# Patient Record
Sex: MALE | Race: WHITE | NOT HISPANIC OR LATINO | Employment: PART TIME | URBAN - METROPOLITAN AREA
[De-identification: names, ages, dates, MRNs, and addresses within clinical notes are randomized per-mention and may not be internally consistent; named-entity substitution may affect disease eponyms.]

---

## 2018-04-17 ENCOUNTER — OFFICE VISIT CONVERTED (OUTPATIENT)
Dept: FAMILY MEDICINE CLINIC | Facility: CLINIC | Age: 53
End: 2018-04-17
Attending: FAMILY MEDICINE

## 2018-08-31 ENCOUNTER — CONVERSION ENCOUNTER (OUTPATIENT)
Dept: FAMILY MEDICINE CLINIC | Facility: CLINIC | Age: 53
End: 2018-08-31

## 2018-08-31 ENCOUNTER — OFFICE VISIT CONVERTED (OUTPATIENT)
Dept: FAMILY MEDICINE CLINIC | Facility: CLINIC | Age: 53
End: 2018-08-31
Attending: FAMILY MEDICINE

## 2018-11-09 ENCOUNTER — OFFICE VISIT CONVERTED (OUTPATIENT)
Dept: FAMILY MEDICINE CLINIC | Facility: CLINIC | Age: 53
End: 2018-11-09
Attending: FAMILY MEDICINE

## 2018-12-03 ENCOUNTER — OFFICE VISIT CONVERTED (OUTPATIENT)
Dept: FAMILY MEDICINE CLINIC | Facility: CLINIC | Age: 53
End: 2018-12-03
Attending: FAMILY MEDICINE

## 2019-01-29 ENCOUNTER — OFFICE VISIT CONVERTED (OUTPATIENT)
Dept: CARDIOLOGY | Facility: CLINIC | Age: 54
End: 2019-01-29
Attending: SPECIALIST

## 2019-09-09 ENCOUNTER — OFFICE VISIT CONVERTED (OUTPATIENT)
Dept: FAMILY MEDICINE CLINIC | Facility: CLINIC | Age: 54
End: 2019-09-09
Attending: FAMILY MEDICINE

## 2019-09-10 ENCOUNTER — HOSPITAL ENCOUNTER (OUTPATIENT)
Dept: FAMILY MEDICINE CLINIC | Facility: CLINIC | Age: 54
Discharge: HOME | End: 2019-09-10
Attending: FAMILY MEDICINE

## 2019-09-10 LAB
CHOLEST SERPL-MCNC: 135 MG/DL (ref 107–200)
CHOLEST/HDLC SERPL: 2.9 {RATIO} (ref 3–6)
GLUCOSE SERPL-MCNC: 94 MG/DL (ref 70–99)
HDLC SERPL-MCNC: 47 MG/DL (ref 40–60)
LDLC SERPL CALC-MCNC: 73 MG/DL (ref 70–100)
TRIGL SERPL-MCNC: 73 MG/DL (ref 40–150)
VLDLC SERPL-MCNC: 15 MG/DL (ref 5–37)

## 2019-09-27 ENCOUNTER — OFFICE VISIT CONVERTED (OUTPATIENT)
Dept: FAMILY MEDICINE CLINIC | Facility: CLINIC | Age: 54
End: 2019-09-27
Attending: FAMILY MEDICINE

## 2019-12-06 ENCOUNTER — OFFICE VISIT CONVERTED (OUTPATIENT)
Dept: ORTHOPEDIC SURGERY | Facility: CLINIC | Age: 54
End: 2019-12-06
Attending: ORTHOPAEDIC SURGERY

## 2019-12-26 ENCOUNTER — HOSPITAL ENCOUNTER (OUTPATIENT)
Dept: URGENT CARE | Facility: CLINIC | Age: 54
Discharge: HOME | End: 2019-12-26
Attending: FAMILY MEDICINE

## 2020-01-15 ENCOUNTER — OFFICE VISIT CONVERTED (OUTPATIENT)
Dept: ORTHOPEDIC SURGERY | Facility: CLINIC | Age: 55
End: 2020-01-15
Attending: ORTHOPAEDIC SURGERY

## 2020-01-31 ENCOUNTER — OFFICE VISIT CONVERTED (OUTPATIENT)
Dept: CARDIOLOGY | Facility: CLINIC | Age: 55
End: 2020-01-31
Attending: SPECIALIST

## 2020-02-05 ENCOUNTER — OFFICE VISIT CONVERTED (OUTPATIENT)
Dept: ORTHOPEDIC SURGERY | Facility: CLINIC | Age: 55
End: 2020-02-05
Attending: ORTHOPAEDIC SURGERY

## 2020-06-09 ENCOUNTER — OFFICE VISIT CONVERTED (OUTPATIENT)
Dept: FAMILY MEDICINE CLINIC | Facility: CLINIC | Age: 55
End: 2020-06-09
Attending: FAMILY MEDICINE

## 2020-08-17 ENCOUNTER — OFFICE VISIT CONVERTED (OUTPATIENT)
Dept: FAMILY MEDICINE CLINIC | Facility: CLINIC | Age: 55
End: 2020-08-17
Attending: FAMILY MEDICINE

## 2020-09-10 ENCOUNTER — OFFICE VISIT CONVERTED (OUTPATIENT)
Dept: FAMILY MEDICINE CLINIC | Facility: CLINIC | Age: 55
End: 2020-09-10
Attending: FAMILY MEDICINE

## 2020-09-10 ENCOUNTER — HOSPITAL ENCOUNTER (OUTPATIENT)
Dept: LAB | Facility: HOSPITAL | Age: 55
Discharge: HOME | End: 2020-09-10
Attending: FAMILY MEDICINE

## 2020-09-10 LAB
CHOLEST SERPL-MCNC: 148 MG/DL (ref 107–200)
CHOLEST/HDLC SERPL: 2.9 {RATIO} (ref 3–6)
GLUCOSE SERPL-MCNC: 92 MG/DL (ref 70–99)
HDLC SERPL-MCNC: 51 MG/DL (ref 40–60)
LDLC SERPL CALC-MCNC: 89 MG/DL (ref 70–100)
PSA SERPL-MCNC: 1.03 NG/ML (ref 0–4)
TRIGL SERPL-MCNC: 40 MG/DL (ref 40–150)
VLDLC SERPL-MCNC: 8 MG/DL (ref 5–37)

## 2021-03-29 ENCOUNTER — OFFICE VISIT CONVERTED (OUTPATIENT)
Dept: ORTHOPEDIC SURGERY | Facility: CLINIC | Age: 56
End: 2021-03-29
Attending: ORTHOPAEDIC SURGERY

## 2021-05-13 NOTE — PROGRESS NOTES
Progress Note      Patient Name: Jose Luis Casper   Patient ID: [id number]   Sex: Male   YOB: 1965    Primary Care Provider: Steve Frederick DO   Referring Provider: Steve Frederick DO    Visit Date: August 17, 2020    Provider: Steve Frederick DO   Location: [facility]   Location Address: [address] [place] [address]  [id number]   Location Phone: [phone]          Chief Complaint  · pt c/o hands cracking open and bleeding      History Of Present Illness  Jose Luis Casper is a 55 year old /White male who presents for evaluation and treatment of: cracking hands. HIs hands have gotten worse again. He has changed soaps and applies lotion several times daily.       Past Medical History  Disease Name Date Onset Notes   Arthritis --  --    Chest pain --  --    Colon cancer screening 09/14/2016 --    Left Elbow Olecranon Bursectomy - Aftercare following surgery 08/27/2015 --    Left Elbow Olecranon Bursitis 08/03/2015 --    Post Musc/Ske Surgery-Left elbow olecranon bursectomy 09/09/2015 --    Right ankle lateral process talus fracture 11/23/2017 --    Seasonal allergies --  --    Thyroid disorder --  --          Past Surgical History  Procedure Name Date Notes   ACL repair --  --    Colonoscopy --  --    EGD 2000 --    Joint Surgery --  --    Lung Biopsy(s) --  --    Thyroidectomy-partial --  --    Toe Surgery --  --          Medication List  Name Date Started Instructions   fluticasone propionate 50 mcg/actuation nasal spray,suspension 06/09/2020 spray 1 spray (50 mcg) in each nostril by intranasal route once daily for 30 days   triamcinolone acetonide 0.1 % topical cream 08/17/2020 apply a thin layer to the affected area(s) by topical route 2 times per day for 1 day         Allergy List  Allergen Name Date Reaction Notes   Acetaminophen adverse reaction --  --  --    bacitracin --  --  --    erythromycin --  --  --    Neosporin --  --  --    NSAIDS --  --  --           Family Medical History  Disease Name Relative/Age Notes   Heart Disease Father/   Father  Mother   Cancer, Unspecified Father/  Mother/   Mother; Father   Diabetes, unspecified type Father/   Father   Family history of certain chronic disabling diseases; arthritis Sister/   Sister   Family history of Arthritis Sister/   Sister         Social History  Finding Status Start/Stop Quantity Notes   Alcohol Current some day --/-- 2 dpw 12/03/2018 -    Alcohol Use Current some day --/-- --  occasionally drinks, less than 1 drink per day, has been drinking for 31 or more years   Claustophobic Unknown --/-- --  yes   Exercises regularly Current some day --/-- --  bicycle, walking, kayaking   lives alone --  --/-- --  --    Recreational Drug Use Never --/-- --  no   Single. --  --/-- --  --    Tobacco Never --/-- --  never smoker   Working --  --/-- --  --          Immunizations  NameDate Admin Mfg Trade Name Lot Number Route Inj VIS Given VIS Publication   Aiccmuhlx52/03/2018 [facility] Fluzone Quadrivalent [id number] IM LD 12/03/2018 08/07/2015   Comments: [id number]         Review of Systems  · Constitutional  o Denies  o : fatigue  · Integument  o Admits  o : skin dryness      Vitals  Date Time BP Position Site L\R Cuff Size HR RR TEMP (F) WT  HT  BMI kg/m2 BSA m2 O2 Sat HC       01/31/2020 09:19 /76 Sitting    68 - R   203lbs 0oz 6'   27.53 2.16     06/09/2020 03:23 /75 Sitting    66 - R  97.8 200lbs 8oz 6'   27.19 2.15 98 %    08/17/2020 12:05 /76 Sitting    70 - R  98.6 202lbs 2oz 6'   27.41 2.16 98 %          Physical Examination  · Constitutional  o Appearance  o : well-nourished, well developed, no obvious deformities present  · Skin and Subcutaneous Tissue  o Digits and Nails  o : cracking and fissuring 3rd digits bilaterally.          Assessment  · Atopic dermatitis, unspecified type     691.8/L20.9  will add Kenalog injection plus add topical. He needs good emmloient  daily      Plan  · Orders  o ACO-39: Current medications updated and reviewed () - - 08/17/2020  o 4.00 - Kenalog Injection 40mg (-5) - 691.8/L20.9 - 08/17/2020   Injection - Kenalog 40 mg; Dose: 40 mg; Site: Right Gluteus; Route: intramuscular; Date: 08/17/2020 13:30:05; Exp: 04/01/2021; Lot: [id number]; Mfg: [employer] TradeName: triamcinolone; Location: [facility]; Administered By: Alexia Benavides MA; Comment: NDC- 8369-1374-94 pt tolerated well with no complaints  · Medications  o triamcinolone acetonide 0.1 % topical cream   SIG: apply a thin layer to the affected area(s) by topical route 2 times per day for 1 day   DISP: (1) 30 gm tube with 1 refills  Adjusted on 08/17/2020     o Medications have been Reconciled  o Transition of Care or Provider Policy  · Instructions  o Patient was educated/instructed on their diagnosis, treatment and medications prior to discharge from the clinic today.            Electronically Signed by: Alexia Benavides MA -Author on August 17, 2020 01:33:43 PM  Electronically Co-signed by: Steve Frederick DO -Reviewer on August 17, 2020 02:12:53 PM

## 2021-05-13 NOTE — PROGRESS NOTES
Progress Note      Patient Name: Jose Luis Casper   Patient ID: [id number]   Sex: Male   YOB: 1965    Primary Care Provider: Steve Frederick DO   Referring Provider: Steve Frederick DO    Visit Date: June 9, 2020    Provider: Steve Frederick DO   Location: [facility]   Location Address: [address] [place] [address]  [id number]   Location Phone: [phone]          Chief Complaint  · pt c/o left ear pain for 3 days      History Of Present Illness  Jose Luis Casper is a 55 year old /White male who presents for evaluation and treatment of: ear ache. He states that he has a left ear ache for the past 3 days. No ear drainage. No fever or chills. No head congestion.       Past Medical History  Disease Name Date Onset Notes   Arthritis --  --    Chest pain --  --    Colon cancer screening 09/14/2016 --    Left Elbow Olecranon Bursectomy - Aftercare following surgery 08/27/2015 --    Left Elbow Olecranon Bursitis 08/03/2015 --    Post Musc/Ske Surgery-Left elbow olecranon bursectomy 09/09/2015 --    Right ankle lateral process talus fracture 11/23/2017 --    Seasonal allergies --  --    Thyroid disorder --  --          Past Surgical History  Procedure Name Date Notes   ACL repair --  --    Colonoscopy --  --    EGD 2000 --    Joint Surgery --  --    Lung Biopsy(s) --  --    Thyroidectomy-partial --  --    Toe Surgery --  --          Allergy List  Allergen Name Date Reaction Notes   Acetaminophen adverse reaction --  --  --    bacitracin --  --  --    erythromycin --  --  --    Neosporin --  --  --    NSAIDS --  --  --          Family Medical History  Disease Name Relative/Age Notes   Heart Disease Father/   Father  Mother   Cancer, Unspecified Father/  Mother/   Mother; Father   Diabetes, unspecified type Father/   Father   Family history of certain chronic disabling diseases; arthritis Sister/   Sister   Family history of Arthritis Sister/   Sister         Social  History  Finding Status Start/Stop Quantity Notes   Alcohol Current some day --/-- 2 dpw 12/03/2018 -    Alcohol Use Current some day --/-- --  occasionally drinks, less than 1 drink per day, has been drinking for 31 or more years   Claustophobic Unknown --/-- --  yes   Exercises regularly Current some day --/-- --  bicycle, walking, kayaking   lives alone --  --/-- --  --    Recreational Drug Use Never --/-- --  no   Single. --  --/-- --  --    Tobacco Never --/-- --  never smoker   Working --  --/-- --  --          Immunizations  NameDate Admin Mfg Trade Name Lot Number Route Inj VIS Given VIS Publication   Neynwkvuq39/03/2018 [facility] Fluzone Quadrivalent [id number] IM LD 12/03/2018 08/07/2015   Comments: [id number]         Review of Systems  · Constitutional  o Denies  o : fatigue, fever, chills  · HENT  o Admits  o : ear pain  o Denies  o : nasal congestion, nasal discharge, postnasal drip, sore throat  · Respiratory  o Denies  o : shortness of breath, wheezing, cough, dyspnea on exertion      Vitals  Date Time BP Position Site L\R Cuff Size HR RR TEMP (F) WT  HT  BMI kg/m2 BSA m2 O2 Sat        09/27/2019 09:21 /71 Sitting    78 - R  97.5 201lbs 4oz 6'   27.29 2.15 98 %    01/31/2020 09:19 /76 Sitting    68 - R   203lbs 0oz 6'   27.53 2.16     06/09/2020 03:23 /75 Sitting    66 - R  97.8 200lbs 8oz 6'   27.19 2.15 98 %          Physical Examination  · Constitutional  o Appearance  o : well-nourished, well developed, alert, in no acute distress, well-tended appearance  · Head and Face  o Head  o :   § Inspection  § : atraumatic, normocephalic  o Face  o :   § Inspection  § : no facial lesions  o HEENT  o : Unremarkable  · Eyes  o Conjunctivae  o : conjunctivae normal  o Sclerae  o : sclerae white  o Pupils and Irises  o : pupils equal and round, pupils reactive to light bilaterally  o Eyelids/Ocular Adnexae  o : eyelid appearance normal  · Ears, Nose, Mouth and Throat  o Ears  o :   § External  Ears  § : appearance within normal limits, no lesions present  § Otoscopic Examination  § : left TM bulging and flat  o Nose  o :   § External Nose  § : appearance normal  o Oral Cavity  o :   § Oral Mucosa  § : oral mucosa normal  § Lips  § : lip appearance normal  § Teeth  § : normal dentition for age  § Gums  § : gums pink, non-swollen, no bleeding present  § Tongue  § : tongue appearance normal  § Palate  § : hard palate normal, soft palate appearance normal  o Throat  o :   § Oropharynx  § : no inflammation or lesions present, tonsils within normal limits  · Neck  o Inspection/Palpation  o : normal appearance, no masses or tenderness, trachea midline  o Thyroid  o : gland size normal, nontender, no nodules or masses present on palpation  · Respiratory  o Respiratory Effort  o : breathing unlabored  o Auscultation of Lungs  o : normal breath sounds  · Cardiovascular  o Heart  o :   § Auscultation of Heart  § : regular rate, normal rhythm, no murmurs present  · Lymphatic  o Neck  o : no lymphadenopathy           Assessment  · Screening for depression     V79.0/Z13.89  · ETD (Eustachian tube dysfunction), left     381.81/H69.82  will add steroid nasal spray      Plan  · Orders  o ACO-18: Negative screen for clinical depression using a standardized tool () - V79.0/Z13.89 - 06/09/2020  o ACO-39: Current medications updated and reviewed () - - 06/09/2020  · Medications  o fluticasone propionate 50 mcg/actuation nasal spray,suspension   SIG: spray 1 spray (50 mcg) in each nostril by intranasal route once daily for 30 days   DISP: (1) 9.9 ml aer w/adap with 1 refills  Prescribed on 06/09/2020     o Medications have been Reconciled  o Transition of Care or Provider Policy  · Instructions  o Depression Screen completed and scanned into the EMR under the designated folder within the patient's documents.  o Today's PHQ-9 result is 2  o Patient is taking medications as prescribed and doing well.   o Take all  medications as prescribed/directed.  o Patient instructed/educated on their diet and exercise program.  o Patient was educated/instructed on their diagnosis, treatment and medications prior to discharge from the clinic today.  o Patient instructed to seek medical attention urgently for new or worsening symptoms.  o Call the office with any concerns or questions.  o Bring all medicines with their bottles to each office visit.  · Disposition  o Call or Return if symptoms worsen or persist.            Electronically Signed by: Steve Frederick, DO -Author on June 9, 2020 03:55:01 PM

## 2021-05-13 NOTE — PROGRESS NOTES
Progress Note      Patient Name: Jose Luis Casper   Patient ID: [id number]   Sex: Male   YOB: 1965    Primary Care Provider: Steve Frederick DO   Referring Provider: Steve Frederick DO    Visit Date: September 10, 2020    Provider: Steve Frederick DO   Location: [facility]   Location Address: [address] [place] [address]  [id number]   Location Phone: [phone]          Chief Complaint  · Annual Physical       History Of Present Illness  Jose Luis Casper is a 55 year old /White male who presents for annual exam. He states that he is doing well. He has NO concerns today.       Past Medical History  Disease Name Date Onset Notes   Arthritis --  --    Chest pain --  --    Colon cancer screening 09/14/2016 --    Left Elbow Olecranon Bursectomy - Aftercare following surgery 08/27/2015 --    Left Elbow Olecranon Bursitis 08/03/2015 --    Post Musc/Ske Surgery-Left elbow olecranon bursectomy 09/09/2015 --    Right ankle lateral process talus fracture 11/23/2017 --    Seasonal allergies --  --    Thyroid disorder --  --          Past Surgical History  Procedure Name Date Notes   ACL repair --  --    Colonoscopy --  --    EGD 2000 --    Joint Surgery --  --    Lung Biopsy(s) --  --    Thyroidectomy-partial --  --    Toe Surgery --  --          Medication List  Name Date Started Instructions   triamcinolone acetonide 0.1 % topical cream 08/17/2020 apply a thin layer to the affected area(s) by topical route 2 times per day for 1 day         Allergy List  Allergen Name Date Reaction Notes   Acetaminophen adverse reaction --  --  --    bacitracin --  --  --    erythromycin --  --  --    Neosporin --  --  --    NSAIDS --  --  --          Family Medical History  Disease Name Relative/Age Notes   Heart Disease Father/   Father  Mother   Cancer, Unspecified Father/  Mother/   Mother; Father   Diabetes, unspecified type Father/   Father   Family history of  certain chronic disabling diseases; arthritis Sister/   Sister   Family history of Arthritis Sister/   Sister         Social History  Finding Status Start/Stop Quantity Notes   Alcohol Current some day --/-- 2 dpw 12/03/2018 -    Alcohol Use Current some day --/-- --  occasionally drinks, less than 1 drink per day, has been drinking for 31 or more years   Claustophobic Unknown --/-- --  yes   Exercises regularly Current some day --/-- --  bicycle, walking, kayaking   lives alone --  --/-- --  --    Recreational Drug Use Never --/-- --  no   Single. --  --/-- --  --    Tobacco Never --/-- --  never smoker   Working --  --/-- --  --          Immunizations  NameDate Admin Mfg Trade Name Lot Number Route Inj VIS Given VIS Publication   Qcaazvkfr73/03/2018 [facility] Fluzone Quadrivalent [id number] IM LD 12/03/2018 08/07/2015   Comments: [id number]         Review of Systems  · Constitutional  o Denies  o : fatigue  · Eyes  o Denies  o : changes in vision  · HENT  o Denies  o : headaches  · Cardiovascular  o Denies  o : chest pain, irregular heart beats, rapid heart rate  · Respiratory  o Denies  o : shortness of breath, wheezing, cough, dyspnea on exertion  · Gastrointestinal  o Denies  o : diarrhea, constipation  · Psychiatric  o Denies  o : anxiety, depression      Vitals  Date Time BP Position Site L\R Cuff Size HR RR TEMP (F) WT  HT  BMI kg/m2 BSA m2 O2 Sat        06/09/2020 03:23 /75 Sitting    66 - R  97.8 200lbs 8oz 6'   27.19 2.15 98 %    08/17/2020 12:05 /76 Sitting    70 - R  98.6 202lbs 2oz 6'   27.41 2.16 98 %    09/10/2020 08:29 /73 Sitting    62 - R  97.8 197lbs 8oz 6'   26.79 2.13 98 %          Physical Examination  · Constitutional  o Appearance  o : well-nourished, well developed, alert, in no acute distress, well-tended appearance  · Head and Face  o Head  o :   § Inspection  § : atraumatic, normocephalic  o Face  o :   § Inspection  § : no facial lesions  o HEENT  o :  Unremarkable  · Eyes  o Conjunctivae  o : conjunctivae normal  o Sclerae  o : sclerae white  o Pupils and Irises  o : pupils equal and round, pupils reactive to light bilaterally  o Eyelids/Ocular Adnexae  o : eyelid appearance normal  · Ears, Nose, Mouth and Throat  o Ears  o :   § External Ears  § : appearance within normal limits, no lesions present  § Otoscopic Examination  § : tympanic membrane appearance within normal limits bilaterally without perforations, mobility normal  o Nose  o :   § External Nose  § : appearance normal  o Oral Cavity  o :   § Oral Mucosa  § : oral mucosa normal  § Lips  § : lip appearance normal  § Teeth  § : normal dentition for age  § Gums  § : gums pink, non-swollen, no bleeding present  § Tongue  § : tongue appearance normal  § Palate  § : hard palate normal, soft palate appearance normal  o Throat  o :   § Oropharynx  § : no inflammation or lesions present, tonsils within normal limits  · Neck  o Inspection/Palpation  o : normal appearance, no masses or tenderness, trachea midline  o Thyroid  o : gland size normal, nontender, no nodules or masses present on palpation  · Respiratory  o Respiratory Effort  o : breathing unlabored  o Auscultation of Lungs  o : normal breath sounds  · Cardiovascular  o Heart  o :   § Auscultation of Heart  § : regular rate, normal rhythm, no murmurs present  o Peripheral Vascular System  o :   § Extremities  § : no edema  · Gastrointestinal  o Abdominal Examination  o : abdomen nontender to palpation, normal bowel sounds, tone normal without rigidity or guarding, no masses present  o Liver and spleen  o : no hepatomegaly present  · Lymphatic  o Neck  o : no lymphadenopathy           Assessment  · Screening for prostate cancer     V76.44/Z12.5  · Encounter for screening for cardiovascular disorders     V81.2/Z13.6  · Annual physical exam     V70.0/Z00.00      Plan  · Orders  o Lipid Panel [facility] (10545) - V70.0/Z00.00 - 09/10/2020  o ACO-39: Current  medications updated and reviewed () - - 09/10/2020  o PSA Ultrasensitive, ANNUAL SCREENING [facility] (17348, ) - V76.44/Z12.5 - 09/10/2020  o Glucose Fasting [facility] (55029) - V70.0/Z00.00 - 09/10/2020  · Medications  o Medications have been Reconciled  o Transition of Care or Provider Policy  · Instructions  o Reviewed health maintenance flowsheet and updated information. Orders were placed and/or patient's response was documented.  o Patient instructed/educated on their diet and exercise program.  o Patient was educated/instructed on their diagnosis, treatment and medications prior to discharge from the clinic today.  o Patient instructed to seek medical attention urgently for new or worsening symptoms.  o Call the office with any concerns or questions.  · Disposition  o Call or Return if symptoms worsen or persist.  o Return Visit Request in/on 1 year +/- 2 days ([id number]).            Electronically Signed by: Steve Frederick, DO -Author on September 10, 2020 08:56:28 AM

## 2021-05-14 VITALS — HEART RATE: 78 BPM | BODY MASS INDEX: 27.27 KG/M2 | HEIGHT: 72 IN | WEIGHT: 201.37 LBS | OXYGEN SATURATION: 98 %

## 2021-05-14 VITALS
OXYGEN SATURATION: 98 % | SYSTOLIC BLOOD PRESSURE: 112 MMHG | BODY MASS INDEX: 26.75 KG/M2 | DIASTOLIC BLOOD PRESSURE: 73 MMHG | TEMPERATURE: 97.8 F | HEIGHT: 72 IN | WEIGHT: 197.5 LBS | HEART RATE: 62 BPM

## 2021-05-15 VITALS — WEIGHT: 203 LBS | HEIGHT: 72 IN | BODY MASS INDEX: 27.5 KG/M2 | HEART RATE: 79 BPM | OXYGEN SATURATION: 98 %

## 2021-05-15 VITALS
SYSTOLIC BLOOD PRESSURE: 113 MMHG | TEMPERATURE: 97.8 F | OXYGEN SATURATION: 98 % | HEART RATE: 66 BPM | DIASTOLIC BLOOD PRESSURE: 75 MMHG | BODY MASS INDEX: 27.16 KG/M2 | WEIGHT: 200.5 LBS | HEIGHT: 72 IN

## 2021-05-15 VITALS
BODY MASS INDEX: 27.26 KG/M2 | HEIGHT: 72 IN | SYSTOLIC BLOOD PRESSURE: 114 MMHG | TEMPERATURE: 97.5 F | DIASTOLIC BLOOD PRESSURE: 71 MMHG | HEART RATE: 78 BPM | WEIGHT: 201.25 LBS | OXYGEN SATURATION: 98 %

## 2021-05-15 VITALS
WEIGHT: 202.12 LBS | SYSTOLIC BLOOD PRESSURE: 113 MMHG | OXYGEN SATURATION: 98 % | HEIGHT: 72 IN | TEMPERATURE: 98.6 F | DIASTOLIC BLOOD PRESSURE: 76 MMHG | HEART RATE: 70 BPM | BODY MASS INDEX: 27.38 KG/M2

## 2021-05-15 VITALS
DIASTOLIC BLOOD PRESSURE: 82 MMHG | HEIGHT: 72 IN | WEIGHT: 201.12 LBS | OXYGEN SATURATION: 98 % | BODY MASS INDEX: 27.24 KG/M2 | HEART RATE: 65 BPM | SYSTOLIC BLOOD PRESSURE: 121 MMHG

## 2021-05-15 VITALS — WEIGHT: 207.5 LBS | HEART RATE: 77 BPM | OXYGEN SATURATION: 98 % | BODY MASS INDEX: 28.1 KG/M2 | HEIGHT: 72 IN

## 2021-05-15 VITALS
SYSTOLIC BLOOD PRESSURE: 130 MMHG | DIASTOLIC BLOOD PRESSURE: 76 MMHG | BODY MASS INDEX: 27.5 KG/M2 | WEIGHT: 203 LBS | HEIGHT: 72 IN | HEART RATE: 68 BPM

## 2021-05-15 VITALS — HEART RATE: 80 BPM | BODY MASS INDEX: 27.68 KG/M2 | WEIGHT: 204.37 LBS | OXYGEN SATURATION: 96 % | HEIGHT: 72 IN

## 2021-05-16 VITALS
SYSTOLIC BLOOD PRESSURE: 106 MMHG | HEIGHT: 72 IN | DIASTOLIC BLOOD PRESSURE: 82 MMHG | BODY MASS INDEX: 27.09 KG/M2 | WEIGHT: 200 LBS | HEART RATE: 74 BPM

## 2021-05-16 VITALS
SYSTOLIC BLOOD PRESSURE: 125 MMHG | DIASTOLIC BLOOD PRESSURE: 86 MMHG | OXYGEN SATURATION: 98 % | WEIGHT: 194 LBS | HEART RATE: 66 BPM | HEIGHT: 72 IN | BODY MASS INDEX: 26.28 KG/M2

## 2021-05-16 VITALS
HEIGHT: 72 IN | BODY MASS INDEX: 27.22 KG/M2 | OXYGEN SATURATION: 96 % | TEMPERATURE: 97.7 F | WEIGHT: 201 LBS | DIASTOLIC BLOOD PRESSURE: 72 MMHG | RESPIRATION RATE: 14 BRPM | SYSTOLIC BLOOD PRESSURE: 115 MMHG | HEART RATE: 82 BPM

## 2021-05-16 VITALS
DIASTOLIC BLOOD PRESSURE: 70 MMHG | BODY MASS INDEX: 27.43 KG/M2 | HEART RATE: 101 BPM | TEMPERATURE: 97.7 F | HEIGHT: 72 IN | WEIGHT: 202.5 LBS | RESPIRATION RATE: 12 BRPM | SYSTOLIC BLOOD PRESSURE: 122 MMHG | OXYGEN SATURATION: 98 %

## 2021-05-16 VITALS
TEMPERATURE: 97.3 F | WEIGHT: 195 LBS | BODY MASS INDEX: 26.41 KG/M2 | HEART RATE: 80 BPM | HEIGHT: 72 IN | DIASTOLIC BLOOD PRESSURE: 72 MMHG | SYSTOLIC BLOOD PRESSURE: 105 MMHG | OXYGEN SATURATION: 97 %

## 2022-04-28 ENCOUNTER — OFFICE VISIT (OUTPATIENT)
Dept: FAMILY MEDICINE CLINIC | Facility: CLINIC | Age: 57
End: 2022-04-28

## 2022-04-28 VITALS
HEART RATE: 78 BPM | BODY MASS INDEX: 27.33 KG/M2 | TEMPERATURE: 97.9 F | SYSTOLIC BLOOD PRESSURE: 115 MMHG | DIASTOLIC BLOOD PRESSURE: 66 MMHG | OXYGEN SATURATION: 97 % | WEIGHT: 201.8 LBS | HEIGHT: 72 IN

## 2022-04-28 DIAGNOSIS — R19.4 CHANGE IN BOWEL HABITS: Primary | ICD-10-CM

## 2022-04-28 DIAGNOSIS — N40.1 BENIGN PROSTATIC HYPERPLASIA WITH URINARY FREQUENCY: ICD-10-CM

## 2022-04-28 DIAGNOSIS — R35.0 BENIGN PROSTATIC HYPERPLASIA WITH URINARY FREQUENCY: ICD-10-CM

## 2022-04-28 PROCEDURE — 99213 OFFICE O/P EST LOW 20 MIN: CPT | Performed by: FAMILY MEDICINE

## 2022-04-28 RX ORDER — TAMSULOSIN HYDROCHLORIDE 0.4 MG/1
1 CAPSULE ORAL DAILY
Qty: 90 CAPSULE | Refills: 3 | Status: SHIPPED | OUTPATIENT
Start: 2022-04-28

## 2022-04-28 NOTE — PROGRESS NOTES
Chief Complaint   Patient presents with   • GI Problem     xSeveral months - Abdominal pressure and bloating, stools are not minimal, pt reports possible lack of fiber intake.        Subjective     Jose Luis Casper  has a past medical history of Arthritis, Chest pain, Limb swelling, Olecranon bursitis of left elbow (08/27/2015), Olecranon bursitis of left elbow (08/03/2015), Olecranon bursitis of left elbow (09/09/2015), Seasonal allergies, Talus fracture (11/23/2017), and Thyroid disorder.    Change of bowel habits- he states over the last couple months he has noticed a change in caliber of his bowel movements.  He has felt bloated and does not feel like he evacuates completely.  He denies any melanotic or bloody stools.  His last colonoscopy was 6 years ago.      PHQ-2 Depression Screening  Little interest or pleasure in doing things?     Feeling down, depressed, or hopeless?     PHQ-2 Total Score     PHQ-9 Depression Screening  Little interest or pleasure in doing things?     Feeling down, depressed, or hopeless?     Trouble falling or staying asleep, or sleeping too much?     Feeling tired or having little energy?     Poor appetite or overeating?     Feeling bad about yourself - or that you are a failure or have let yourself or your family down?     Trouble concentrating on things, such as reading the newspaper or watching television?     Moving or speaking so slowly that other people could have noticed? Or the opposite - being so fidgety or restless that you have been moving around a lot more than usual?     Thoughts that you would be better off dead, or of hurting yourself in some way?     PHQ-9 Total Score     If you checked off any problems, how difficult have these problems made it for you to do your work, take care of things at home, or get along with other people?       Allergies   Allergen Reactions   • Acetaminophen Swelling   • Bacitracin Rash       Prior to Admission medications    Not on File     "    Patient Active Problem List   Diagnosis   • Change in bowel habits   • Benign prostatic hyperplasia with urinary frequency        Past Surgical History:   Procedure Laterality Date   • COLONOSCOPY     • ENDOSCOPY  2000   • KNEE ACL RECONSTRUCTION     • LUNG BIOPSY     • OTHER SURGICAL HISTORY      joint surgery   • TOE SURGERY         Social History     Socioeconomic History   • Marital status: Single   Tobacco Use   • Smoking status: Never Smoker   • Smokeless tobacco: Never Used   Vaping Use   • Vaping Use: Never used   Substance and Sexual Activity   • Alcohol use: Yes     Comment: current some day- 2dpw 12/3/18- drinks weekly -1 drink per day- been drinking for 31 or more years    • Drug use: Never       Family History   Problem Relation Age of Onset   • Cancer Mother         unspecified   • Heart disease Father    • Diabetes Father         unspecified type   • Arthritis Father    • Arthritis Sister        Family history, surgical history, past medical history, Allergies and med's reviewed with patient today and updated in [facility] EMR.     ROS:  Review of Systems   Constitutional: Negative for chills, fatigue, fever and unexpected weight loss.   Gastrointestinal: Negative for abdominal pain, blood in stool, constipation and diarrhea.        (+) Change in stool caliber       OBJECTIVE:  Vitals:    04/28/22 0817   BP: 115/66   BP Location: Left arm   Patient Position: Sitting   Cuff Size: Adult   Pulse: 78   Temp: 97.9 °F (36.6 °C)   TempSrc: Temporal   SpO2: 97%   Weight: 91.5 kg (201 lb 12.8 oz)   Height: 182.9 cm (72\")     No exam data present   Body mass index is 27.37 kg/m².  No LMP for male patient.    Physical Exam  Vitals and nursing note reviewed.   Constitutional:       General: He is not in acute distress.     Appearance: Normal appearance. He is normal weight.   HENT:      Head: Normocephalic.   Abdominal:      General: Abdomen is flat. Bowel sounds are normal.      Palpations: Abdomen is soft. There " is no hepatomegaly.      Tenderness: There is abdominal tenderness in the suprapubic area.   Genitourinary:     Prostate: Enlarged. Not tender and no nodules present.   Neurological:      Mental Status: He is alert.         Procedures    No visits with results within 30 Day(s) from this visit.   Latest known visit with results is:   No results found for any previous visit.       ASSESSMENT/ PLAN:    Diagnoses and all orders for this visit:    1. Change in bowel habits (Primary)  Assessment & Plan:  He states his symptoms are somewhat improved with adding bulk fiber.  But due to this change in bowel habits we will go ahead and set him up for a colonoscopy sooner than later.    Orders:  -     Ambulatory Referral For Screening Colonoscopy    2. Benign prostatic hyperplasia with urinary frequency  Assessment & Plan:  His prostate is mildly enlarged and with minimal symptoms we will add on some Flomax.      Other orders  -     tamsulosin (FLOMAX) 0.4 MG capsule 24 hr capsule; Take 1 capsule by mouth Daily.  Dispense: 90 capsule; Refill: 3      Orders Placed Today:     New Medications Ordered This Visit   Medications   • tamsulosin (FLOMAX) 0.4 MG capsule 24 hr capsule     Sig: Take 1 capsule by mouth Daily.     Dispense:  90 capsule     Refill:  3        Management Plan:     An After Visit Summary was printed and given to the patient at discharge.    Follow-up: Return in about 6 months (around 10/28/2022) for Recheck.    Steve Frederick DO 4/28/2022 08:38 EDT  This note was electronically signed.

## 2022-04-28 NOTE — ASSESSMENT & PLAN NOTE
He states his symptoms are somewhat improved with adding bulk fiber.  But due to this change in bowel habits we will go ahead and set him up for a colonoscopy sooner than later.

## 2022-06-15 ENCOUNTER — OFFICE VISIT (OUTPATIENT)
Dept: GASTROENTEROLOGY | Facility: CLINIC | Age: 57
End: 2022-06-15

## 2022-06-15 VITALS
HEART RATE: 86 BPM | DIASTOLIC BLOOD PRESSURE: 78 MMHG | OXYGEN SATURATION: 96 % | HEIGHT: 72 IN | WEIGHT: 196.2 LBS | SYSTOLIC BLOOD PRESSURE: 112 MMHG | BODY MASS INDEX: 26.57 KG/M2

## 2022-06-15 DIAGNOSIS — K64.8 INTERNAL HEMORRHOIDS: ICD-10-CM

## 2022-06-15 DIAGNOSIS — Z86.010 HISTORY OF COLON POLYPS: ICD-10-CM

## 2022-06-15 DIAGNOSIS — R19.4 CHANGE IN BOWEL HABITS: Primary | ICD-10-CM

## 2022-06-15 PROBLEM — Z86.0100 HISTORY OF COLON POLYPS: Status: ACTIVE | Noted: 2022-06-15

## 2022-06-15 PROCEDURE — 99204 OFFICE O/P NEW MOD 45 MIN: CPT | Performed by: NURSE PRACTITIONER

## 2022-06-15 RX ORDER — HYDROCORTISONE 25 MG/G
CREAM TOPICAL 2 TIMES DAILY
Qty: 1 EACH | Refills: 2 | Status: SHIPPED | OUTPATIENT
Start: 2022-06-15 | End: 2022-06-29

## 2022-06-15 RX ORDER — SOD SULF/POT CHLORIDE/MAG SULF 1.479 G
12 TABLET ORAL TAKE AS DIRECTED
Qty: 24 TABLET | Refills: 0 | Status: SHIPPED | OUTPATIENT
Start: 2022-06-15 | End: 2022-10-20

## 2022-06-15 NOTE — PROGRESS NOTES
Chief Complaint  Changes in bowel movement (Pt states its getting smaller)    Jose Luis Casper is a 57 y.o. male who presents to [facility] on referral from No ref. provider found for a gastroenterology evaluation of change in bowel movements.      History of Present Illness    Previous colonoscopy performed 1014 2016-7 mm polyp in the ascending colon-tubular adenoma, completely removed internal hemorrhoids.  Otherwise normal colonoscopy.    He reports change in caliber of stools about two months ago.  Denies rectal bleeding.  Reports a bowel movement 2-5 times per day.  He starting taking fiber and feels that has improved stool caliber some.  He has also noticed the feeling of incomplete evacuation.  Admits having hemorrhoids that have been flared up.        Past Medical History:   Diagnosis Date   • Arthritis    • Chest pain    • Limb swelling    • Olecranon bursitis of left elbow 08/27/2015    aftercare following surgery   • Olecranon bursitis of left elbow 08/03/2015   • Olecranon bursitis of left elbow 09/09/2015    post musc/ske surgery   • Seasonal allergies    • Talus fracture 11/23/2017    right ankle lateral process   • Thyroid disorder        Past Surgical History:   Procedure Laterality Date   • COLONOSCOPY     • ENDOSCOPY  2000   • KNEE ACL RECONSTRUCTION     • LUNG BIOPSY     • OTHER SURGICAL HISTORY      joint surgery   • TOE SURGERY           Current Outpatient Medications:   •  tamsulosin (FLOMAX) 0.4 MG capsule 24 hr capsule, Take 1 capsule by mouth Daily., Disp: 90 capsule, Rfl: 3  •  Hydrocortisone, Perianal, (ANUSOL-HC) 2.5 % rectal cream, Insert  into the rectum 2 (Two) Times a Day for 14 days., Disp: 1 each, Rfl: 2  •  Sodium Sulfate-Mag Sulfate-KCl (Sutab) 8346-896-687 MG tablet, Take 12 tablets by mouth Take As Directed. Take 12 tablets at 6 pm and 12 tablets 4 hours prior to procedure., Disp: 24 tablet, Rfl: 0     Allergies   Allergen Reactions   •  "Acetaminophen Swelling   • Bacitracin Rash       Family History   Problem Relation Age of Onset   • Cancer Mother         unspecified   • Heart disease Father    • Diabetes Father         unspecified type   • Arthritis Father    • Arthritis Sister         Social History     Social History Narrative   • Not on file       Immunization:  Immunization History   Administered Date(s) Administered   • COVID-19 (MODERNA) 1st, 2nd, 3rd Dose Only 12/29/2020, 01/26/2021, 11/10/2021   • Influenza, Unspecified 12/03/2018, 10/14/2020        Objective       Vital Signs:   /78   Pulse 86   Ht 182.9 cm (72\")   Wt 89 kg (196 lb 3.2 oz)   SpO2 96%   BMI 26.61 kg/m²       Physical Exam  Constitutional:       General: He is not in acute distress.     Appearance: Normal appearance. He is well-developed and normal weight.   HENT:      Head: Normocephalic and atraumatic.   Eyes:      Conjunctiva/sclera: Conjunctivae normal.      Pupils: Pupils are equal, round, and reactive to light.      Visual Fields: Right eye visual fields normal and left eye visual fields normal.   Cardiovascular:      Rate and Rhythm: Normal rate and regular rhythm.      Heart sounds: Normal heart sounds.   Pulmonary:      Effort: Pulmonary effort is normal. No retractions.      Breath sounds: Normal breath sounds and air entry.   Abdominal:      General: Bowel sounds are normal.      Palpations: Abdomen is soft.      Tenderness: There is no abdominal tenderness.      Comments: No appreciable hepatosplenomegaly or ascites   Musculoskeletal:         General: Normal range of motion.      Cervical back: Neck supple.      Right lower leg: No edema.      Left lower leg: No edema.   Lymphadenopathy:      Cervical: No cervical adenopathy.   Skin:     General: Skin is warm and dry.      Findings: No lesion.   Neurological:      General: No focal deficit present.      Mental Status: He is alert and oriented to person, place, and time.   Psychiatric:         Mood " and Affect: Mood and affect normal.         Behavior: Behavior normal.         Result Review :                       Assessment and Plan    Diagnoses and all orders for this visit:    1. Change in bowel habits (Primary)  -     Case Request; Standing  -     Case Request    2. History of colon polyps  -     Case Request; Standing  -     Case Request    3. Internal hemorrhoids  -     Hydrocortisone, Perianal, (ANUSOL-HC) 2.5 % rectal cream; Insert  into the rectum 2 (Two) Times a Day for 14 days.  Dispense: 1 each; Refill: 2  -     Case Request; Standing  -     Case Request    Other orders  -     Sodium Sulfate-Mag Sulfate-KCl (Sutab) 9690-939-949 MG tablet; Take 12 tablets by mouth Take As Directed. Take 12 tablets at 6 pm and 12 tablets 4 hours prior to procedure.  Dispense: 24 tablet; Refill: 0  -     Follow Anesthesia Guidelines / Protocol; Future  -     Obtain Informed Consent; Future        COLONOSCOPY (N/A) Surgical Risk and Benefits discussed: Possible risks/complications, benefits, and alternatives to surgical or invasive procedure have been explained to patient and/or legal guardian; risks include bleeding, infection, and perforation. Patient has been evaluated and can tolerate anesthesia and/or sedation. Risks, benefits, and alternatives to anesthesia and sedation have been explained to patient and/or legal guardian.          Follow Up   Return for F/U after procedure.  Patient was given instructions and counseling regarding his condition or for health maintenance advice. Please see specific information pulled into the AVS if appropriate.

## 2022-06-23 ENCOUNTER — PATIENT ROUNDING (BHMG ONLY) (OUTPATIENT)
Dept: GASTROENTEROLOGY | Facility: CLINIC | Age: 57
End: 2022-06-23

## 2022-06-23 NOTE — PROGRESS NOTES
6/23/2022      Hello, may I speak with Jose Luis Casper     My name is [place], Clinical Coordinator. I am calling from [facility].    Before we get started may I verify your date of birth? 1965    I am calling to officially welcome you to our practice and ask about your recent visit. Is this a good time to talk? Yes    Tell me about your visit with us. What things went well?  Patient states that things went okay. Patient states that he did not have to wait and the explanation was great.     We're always looking for ways to make our patients' experiences even better. Do you have recommendations on ways we may improve? No    Overall were you satisfied with your first visit to our practice? Yes    I appreciate you taking the time to speak with me today. Is there anything else I can do for you? No    We would really appreciate you completing a survey if you receive one in the mail or via email.       Thank you, and have a great day.

## 2022-09-03 ENCOUNTER — HOSPITAL ENCOUNTER (EMERGENCY)
Facility: HOSPITAL | Age: 57
Discharge: ANOTHER HOSPITAL | End: 2022-09-04
Attending: EMERGENCY MEDICINE | Admitting: EMERGENCY MEDICINE

## 2022-09-03 DIAGNOSIS — H40.053 INCREASED PRESSURE IN THE EYE, BILATERAL: Primary | ICD-10-CM

## 2022-09-03 LAB
ALBUMIN SERPL-MCNC: 4 G/DL (ref 3.5–5.2)
ALBUMIN/GLOB SERPL: 1.3 G/DL
ALP SERPL-CCNC: 100 U/L (ref 39–117)
ALT SERPL W P-5'-P-CCNC: 16 U/L (ref 1–41)
ANION GAP SERPL CALCULATED.3IONS-SCNC: 9.1 MMOL/L (ref 5–15)
AST SERPL-CCNC: 21 U/L (ref 1–40)
BASOPHILS # BLD AUTO: 0.01 10*3/MM3 (ref 0–0.2)
BASOPHILS NFR BLD AUTO: 0.1 % (ref 0–1.5)
BILIRUB SERPL-MCNC: 0.8 MG/DL (ref 0–1.2)
BILIRUB UR QL STRIP: NEGATIVE
BUN SERPL-MCNC: 13 MG/DL (ref 6–20)
BUN/CREAT SERPL: 18.6 (ref 7–25)
CALCIUM SPEC-SCNC: 9.4 MG/DL (ref 8.6–10.5)
CHLORIDE SERPL-SCNC: 103 MMOL/L (ref 98–107)
CLARITY UR: CLEAR
CO2 SERPL-SCNC: 26.9 MMOL/L (ref 22–29)
COLOR UR: ABNORMAL
CREAT SERPL-MCNC: 0.7 MG/DL (ref 0.76–1.27)
D-LACTATE SERPL-SCNC: 1.3 MMOL/L (ref 0.5–2)
DEPRECATED RDW RBC AUTO: 39.4 FL (ref 37–54)
EGFRCR SERPLBLD CKD-EPI 2021: 107.5 ML/MIN/1.73
EOSINOPHIL # BLD AUTO: 0.02 10*3/MM3 (ref 0–0.4)
EOSINOPHIL NFR BLD AUTO: 0.2 % (ref 0.3–6.2)
ERYTHROCYTE [DISTWIDTH] IN BLOOD BY AUTOMATED COUNT: 12.5 % (ref 12.3–15.4)
GLOBULIN UR ELPH-MCNC: 3 GM/DL
GLUCOSE SERPL-MCNC: 127 MG/DL (ref 65–99)
GLUCOSE UR STRIP-MCNC: NEGATIVE MG/DL
HCT VFR BLD AUTO: 44.6 % (ref 37.5–51)
HGB BLD-MCNC: 15.6 G/DL (ref 13–17.7)
HGB UR QL STRIP.AUTO: NEGATIVE
HOLD SPECIMEN: NORMAL
HOLD SPECIMEN: NORMAL
IMM GRANULOCYTES # BLD AUTO: 0.02 10*3/MM3 (ref 0–0.05)
IMM GRANULOCYTES NFR BLD AUTO: 0.2 % (ref 0–0.5)
KETONES UR QL STRIP: ABNORMAL
LEUKOCYTE ESTERASE UR QL STRIP.AUTO: NEGATIVE
LIPASE SERPL-CCNC: 10 U/L (ref 13–60)
LYMPHOCYTES # BLD AUTO: 0.79 10*3/MM3 (ref 0.7–3.1)
LYMPHOCYTES NFR BLD AUTO: 9.6 % (ref 19.6–45.3)
MCH RBC QN AUTO: 30.4 PG (ref 26.6–33)
MCHC RBC AUTO-ENTMCNC: 35 G/DL (ref 31.5–35.7)
MCV RBC AUTO: 86.8 FL (ref 79–97)
MONOCYTES # BLD AUTO: 0.56 10*3/MM3 (ref 0.1–0.9)
MONOCYTES NFR BLD AUTO: 6.8 % (ref 5–12)
NEUTROPHILS NFR BLD AUTO: 6.86 10*3/MM3 (ref 1.7–7)
NEUTROPHILS NFR BLD AUTO: 83.1 % (ref 42.7–76)
NITRITE UR QL STRIP: NEGATIVE
NRBC BLD AUTO-RTO: 0 /100 WBC (ref 0–0.2)
PH UR STRIP.AUTO: 7.5 [PH] (ref 5–8)
PLATELET # BLD AUTO: 204 10*3/MM3 (ref 140–450)
PMV BLD AUTO: 8.7 FL (ref 6–12)
POTASSIUM SERPL-SCNC: 4.6 MMOL/L (ref 3.5–5.2)
PROT SERPL-MCNC: 7 G/DL (ref 6–8.5)
PROT UR QL STRIP: ABNORMAL
RBC # BLD AUTO: 5.14 10*6/MM3 (ref 4.14–5.8)
SODIUM SERPL-SCNC: 139 MMOL/L (ref 136–145)
SP GR UR STRIP: 1.02 (ref 1–1.03)
UROBILINOGEN UR QL STRIP: ABNORMAL
WBC NRBC COR # BLD: 8.26 10*3/MM3 (ref 3.4–10.8)
WHOLE BLOOD HOLD COAG: NORMAL
WHOLE BLOOD HOLD SPECIMEN: NORMAL

## 2022-09-03 PROCEDURE — 25010000002 KETOROLAC TROMETHAMINE PER 15 MG: Performed by: NURSE PRACTITIONER

## 2022-09-03 PROCEDURE — 83605 ASSAY OF LACTIC ACID: CPT

## 2022-09-03 PROCEDURE — 36415 COLL VENOUS BLD VENIPUNCTURE: CPT

## 2022-09-03 PROCEDURE — 80053 COMPREHEN METABOLIC PANEL: CPT

## 2022-09-03 PROCEDURE — 96361 HYDRATE IV INFUSION ADD-ON: CPT

## 2022-09-03 PROCEDURE — 96374 THER/PROPH/DIAG INJ IV PUSH: CPT

## 2022-09-03 PROCEDURE — 85025 COMPLETE CBC W/AUTO DIFF WBC: CPT

## 2022-09-03 PROCEDURE — 83690 ASSAY OF LIPASE: CPT

## 2022-09-03 PROCEDURE — 81003 URINALYSIS AUTO W/O SCOPE: CPT

## 2022-09-03 PROCEDURE — 99284 EMERGENCY DEPT VISIT MOD MDM: CPT

## 2022-09-03 RX ORDER — KETOROLAC TROMETHAMINE 30 MG/ML
30 INJECTION, SOLUTION INTRAMUSCULAR; INTRAVENOUS ONCE
Status: COMPLETED | OUTPATIENT
Start: 2022-09-03 | End: 2022-09-03

## 2022-09-03 RX ORDER — PROPARACAINE HYDROCHLORIDE 5 MG/ML
2 SOLUTION/ DROPS OPHTHALMIC ONCE
Status: COMPLETED | OUTPATIENT
Start: 2022-09-03 | End: 2022-09-03

## 2022-09-03 RX ORDER — DORZOLAMIDE HCL 20 MG/ML
1 SOLUTION/ DROPS OPHTHALMIC
Status: COMPLETED | OUTPATIENT
Start: 2022-09-04 | End: 2022-09-04

## 2022-09-03 RX ORDER — TIMOLOL MALEATE 5 MG/ML
1 SOLUTION/ DROPS OPHTHALMIC
Status: DISCONTINUED | OUTPATIENT
Start: 2022-09-04 | End: 2022-09-04 | Stop reason: HOSPADM

## 2022-09-03 RX ORDER — ACETAZOLAMIDE 250 MG/1
500 TABLET ORAL ONCE
Status: COMPLETED | OUTPATIENT
Start: 2022-09-04 | End: 2022-09-04

## 2022-09-03 RX ORDER — SODIUM CHLORIDE 0.9 % (FLUSH) 0.9 %
10 SYRINGE (ML) INJECTION AS NEEDED
Status: DISCONTINUED | OUTPATIENT
Start: 2022-09-03 | End: 2022-09-04 | Stop reason: HOSPADM

## 2022-09-03 RX ADMIN — PROPARACAINE HYDROCHLORIDE 2 DROP: 5 SOLUTION/ DROPS OPHTHALMIC at 22:24

## 2022-09-03 RX ADMIN — SODIUM CHLORIDE 1000 ML: 9 INJECTION, SOLUTION INTRAVENOUS at 20:34

## 2022-09-03 RX ADMIN — KETOROLAC TROMETHAMINE 30 MG: 30 INJECTION, SOLUTION INTRAMUSCULAR; INTRAVENOUS at 20:35

## 2022-09-04 VITALS
OXYGEN SATURATION: 95 % | BODY MASS INDEX: 25.73 KG/M2 | TEMPERATURE: 97.6 F | RESPIRATION RATE: 16 BRPM | HEART RATE: 67 BPM | HEIGHT: 72 IN | DIASTOLIC BLOOD PRESSURE: 74 MMHG | SYSTOLIC BLOOD PRESSURE: 122 MMHG | WEIGHT: 190 LBS

## 2022-09-04 RX ORDER — BRIMONIDINE TARTRATE 2 MG/ML
1 SOLUTION/ DROPS OPHTHALMIC
Status: DISCONTINUED | OUTPATIENT
Start: 2022-09-04 | End: 2022-09-04 | Stop reason: HOSPADM

## 2022-09-04 RX ADMIN — ACETAZOLAMIDE 500 MG: 250 TABLET ORAL at 00:33

## 2022-09-04 RX ADMIN — DORZOLAMIDE HCL 1 DROP: 20 SOLUTION/ DROPS OPHTHALMIC at 00:34

## 2022-09-04 RX ADMIN — TIMOLOL MALEATE 1 DROP: 5 SOLUTION/ DROPS OPHTHALMIC at 00:34

## 2022-09-04 RX ADMIN — BRIMONIDINE TARTRATE 1 DROP: 1 SOLUTION/ DROPS OPHTHALMIC at 01:00

## 2022-09-04 RX ADMIN — BRIMONIDINE TARTRATE 1 DROP: 2 SOLUTION/ DROPS OPHTHALMIC at 00:34

## 2022-09-04 RX ADMIN — BRIMONIDINE TARTRATE 1 DROP: 2 SOLUTION/ DROPS OPHTHALMIC at 01:00

## 2022-09-04 RX ADMIN — TIMOLOL MALEATE 1 DROP: 5 SOLUTION/ DROPS OPHTHALMIC at 01:00

## 2022-09-04 NOTE — ED PROVIDER NOTES
Geoffrey Amaya is a 57-year-old male that presents to the emergency department today via EMS for complaints of bilateral eye redness, photophobia, pain, pressure, tearing, blurred vision since this morning whenever he woke up.  He also is complaining of nausea, vomiting, headache, chills, body aches after recently being diagnosed with COVID this past Wednesday.  Patient reports he does not wear contacts but only eyeglasses and his recent eye exam was early this summer.  He denies any visual changes like this before but reports he has been seeing halos for the last few weeks.  He rates his eye pressure today an 8 out of 10.  He denies any headache currently.  No further complaints.          Review of Systems   Constitutional: Positive for chills.   Eyes: Positive for photophobia, pain, discharge, redness and visual disturbance. Negative for itching.        +tearing bilaterally   Gastrointestinal: Positive for nausea and vomiting.   Neurological: Positive for headaches.   All other systems reviewed and are negative.      Past Medical History:   Diagnosis Date   • Arthritis    • Chest pain    • Limb swelling    • Olecranon bursitis of left elbow 08/27/2015    aftercare following surgery   • Olecranon bursitis of left elbow 08/03/2015   • Olecranon bursitis of left elbow 09/09/2015    post musc/ske surgery   • Seasonal allergies    • Talus fracture 11/23/2017    right ankle lateral process   • Thyroid disorder        Allergies   Allergen Reactions   • Acetaminophen Swelling   • Bacitracin Rash       Past Surgical History:   Procedure Laterality Date   • COLONOSCOPY     • ENDOSCOPY  2000   • KNEE ACL RECONSTRUCTION     • LUNG BIOPSY     • OTHER SURGICAL HISTORY      joint surgery   • TOE SURGERY         Family History   Problem Relation Age of Onset   • Cancer Mother         unspecified   • Heart disease Father    • Diabetes Father         unspecified type   • Arthritis Father    • Arthritis Sister        Social  History     Socioeconomic History   • Marital status: Single   Tobacco Use   • Smoking status: Never Smoker   • Smokeless tobacco: Never Used   Vaping Use   • Vaping Use: Never used   Substance and Sexual Activity   • Alcohol use: Yes     Comment: current some day- 2dpw 12/3/18- drinks weekly -1 drink per day- been drinking for 31 or more years    • Drug use: Never           Objective   Physical Exam  Vitals and nursing note reviewed.   Constitutional:       General: He is not in acute distress.     Appearance: Normal appearance. He is not ill-appearing, toxic-appearing or diaphoretic.   HENT:      Head: Normocephalic and atraumatic.      Nose: Nose normal.      Mouth/Throat:      Mouth: Mucous membranes are moist.   Eyes:      General: Lids are normal. Lids are everted, no foreign bodies appreciated. Gaze aligned appropriately. Allergic shiner present. No scleral icterus.        Right eye: No foreign body, discharge or hordeolum.         Left eye: No foreign body, discharge or hordeolum.      Intraocular pressure: Right eye pressure is 45 mmHg. Left eye pressure is 50 mmHg. Measurements were taken using a handheld tonometer.     Extraocular Movements: Extraocular movements intact.      Right eye: Normal extraocular motion and no nystagmus.      Left eye: Normal extraocular motion and no nystagmus.      Conjunctiva/sclera:      Right eye: Right conjunctiva is injected. No chemosis, exudate or hemorrhage.     Left eye: Left conjunctiva is injected. No chemosis, exudate or hemorrhage.     Pupils: Pupils are equal, round, and reactive to light.      Right eye: Pupil is round, reactive and not sluggish. No corneal abrasion or fluorescein uptake. Kathe exam negative.      Left eye: Pupil is round, reactive and not sluggish. No corneal abrasion or fluorescein uptake. Kathe exam negative.     Comments: Injected conjunctivae bilaterally    Left 20/200  Right 20/50   Cardiovascular:      Rate and Rhythm: Normal rate and  regular rhythm.      Pulses: Normal pulses.      Heart sounds: Normal heart sounds.   Pulmonary:      Effort: Pulmonary effort is normal.      Breath sounds: Normal breath sounds.   Abdominal:      General: Abdomen is flat. Bowel sounds are normal.      Palpations: Abdomen is soft.   Musculoskeletal:         General: Normal range of motion.      Cervical back: Normal range of motion and neck supple.   Skin:     General: Skin is warm and dry.      Capillary Refill: Capillary refill takes less than 2 seconds.   Neurological:      Mental Status: He is alert and oriented to person, place, and time. Mental status is at baseline.   Psychiatric:         Mood and Affect: Mood normal.         Behavior: Behavior normal.         Thought Content: Thought content normal.         Judgment: Judgment normal.         Procedures           ED Course                                           MDM  Number of Diagnoses or Management Options  Increased pressure in the eye, bilateral  Diagnosis management comments: Seen and assessed patient as noted.  Vitals stable, no acute distress, afebrile.       Differentials include but are not limited to:  covid symptoms, corneal abrasion, foreign body of eye, allergic conjunctivitis, bacterial conjunctivitis, glaucoma.  Other etiologies.    Left eye is 20/200.  Right eye is 20/50.  Woods lamp reveals no corneal abrasion, foreign body, negative Kathe.  Pressures bilaterally taken multiple times show bilateral pressures of 45-55.  I discussed these findings with my attending who advised me to call [facility] ophthalmology for transfer.    I spoke with Dr Hillman from [facility] who feels pt may very well have glaucoma and advised transfer.  He gave me orders for Diamox 500 mg PO once, then 3 eye gtts to be given every 30 mins until is seen by Dr Hillman in [place].  Timolol, alphagen, dorzolamide.  I tried calling Dr Hillman back for clarification on the % but no one called me back.  Marianne said she  would call me back with verification but never did.  Given the emergent situation our pharmacist chose the best eyedrop % and the ones available to us and we started them immediately.         Amount and/or Complexity of Data Reviewed  Clinical lab tests: reviewed and ordered  Discuss the patient with other providers: yes    Risk of Complications, Morbidity, and/or Mortality  Presenting problems: low  Diagnostic procedures: low  Management options: high    Patient Progress  Patient progress: stable      Final diagnoses:   Increased pressure in the eye, bilateral       ED Disposition  ED Disposition     ED Disposition   Transfer to Another Facility     Condition   --    Comment   --             No follow-up provider specified.       Medication List      No changes were made to your prescriptions during this visit.          Candy Ly, LEVON  09/04/22 0008       Candy Ly, LEVON  09/04/22 0039       Candy Ly, LEVON  09/04/22 0041

## 2022-09-04 NOTE — ED PROVIDER NOTES
"Patient is 57 y.o. year old male that presents to the ED for evaluation of acute glaucoma attack.    Physical Exam    ED Course:    /80 (BP Location: Left arm, Patient Position: Lying)   Pulse 70   Temp 97.6 °F (36.4 °C) (Oral)   Resp 16   Ht 182.9 cm (72\")   Wt 86.2 kg (190 lb)   SpO2 97%   BMI 25.77 kg/m²   Results for orders placed or performed during the hospital encounter of 09/03/22   Comprehensive Metabolic Panel    Specimen: Blood   Result Value Ref Range    Glucose 127 (H) 65 - 99 mg/dL    BUN 13 6 - 20 mg/dL    Creatinine 0.70 (L) 0.76 - 1.27 mg/dL    Sodium 139 136 - 145 mmol/L    Potassium 4.6 3.5 - 5.2 mmol/L    Chloride 103 98 - 107 mmol/L    CO2 26.9 22.0 - 29.0 mmol/L    Calcium 9.4 8.6 - 10.5 mg/dL    Total Protein 7.0 6.0 - 8.5 g/dL    Albumin 4.00 3.50 - 5.20 g/dL    ALT (SGPT) 16 1 - 41 U/L    AST (SGOT) 21 1 - 40 U/L    Alkaline Phosphatase 100 39 - 117 U/L    Total Bilirubin 0.8 0.0 - 1.2 mg/dL    Globulin 3.0 gm/dL    A/G Ratio 1.3 g/dL    BUN/Creatinine Ratio 18.6 7.0 - 25.0    Anion Gap 9.1 5.0 - 15.0 mmol/L    eGFR 107.5 >60.0 mL/min/1.73   Lipase    Specimen: Blood   Result Value Ref Range    Lipase 10 (L) 13 - 60 U/L   Urinalysis With Microscopic If Indicated (No Culture) - Urine, Clean Catch    Specimen: Urine, Clean Catch   Result Value Ref Range    Color, UA Dark Yellow (A) Yellow, Straw    Appearance, UA Clear Clear    pH, UA 7.5 5.0 - 8.0    Specific Gravity, UA 1.023 1.005 - 1.030    Glucose, UA Negative Negative    Ketones, UA 15 mg/dL (1+) (A) Negative    Bilirubin, UA Negative Negative    Blood, UA Negative Negative    Protein, UA Trace (A) Negative    Leuk Esterase, UA Negative Negative    Nitrite, UA Negative Negative    Urobilinogen, UA 1.0 E.U./dL 0.2 - 1.0 E.U./dL   Lactic Acid, Plasma    Specimen: Blood   Result Value Ref Range    Lactate 1.3 0.5 - 2.0 mmol/L   CBC Auto Differential    Specimen: Blood   Result Value Ref Range    WBC 8.26 3.40 - 10.80 10*3/mm3 "    RBC 5.14 4.14 - 5.80 10*6/mm3    Hemoglobin 15.6 13.0 - 17.7 g/dL    Hematocrit 44.6 37.5 - 51.0 %    MCV 86.8 79.0 - 97.0 fL    MCH 30.4 26.6 - 33.0 pg    MCHC 35.0 31.5 - 35.7 g/dL    RDW 12.5 12.3 - 15.4 %    RDW-SD 39.4 37.0 - 54.0 fl    MPV 8.7 6.0 - 12.0 fL    Platelets 204 140 - 450 10*3/mm3    Neutrophil % 83.1 (H) 42.7 - 76.0 %    Lymphocyte % 9.6 (L) 19.6 - 45.3 %    Monocyte % 6.8 5.0 - 12.0 %    Eosinophil % 0.2 (L) 0.3 - 6.2 %    Basophil % 0.1 0.0 - 1.5 %    Immature Grans % 0.2 0.0 - 0.5 %    Neutrophils, Absolute 6.86 1.70 - 7.00 10*3/mm3    Lymphocytes, Absolute 0.79 0.70 - 3.10 10*3/mm3    Monocytes, Absolute 0.56 0.10 - 0.90 10*3/mm3    Eosinophils, Absolute 0.02 0.00 - 0.40 10*3/mm3    Basophils, Absolute 0.01 0.00 - 0.20 10*3/mm3    Immature Grans, Absolute 0.02 0.00 - 0.05 10*3/mm3    nRBC 0.0 0.0 - 0.2 /100 WBC   Green Top (Gel)   Result Value Ref Range    Extra Tube Hold for add-ons.    Lavender Top   Result Value Ref Range    Extra Tube hold for add-on    Gold Top - SST   Result Value Ref Range    Extra Tube Hold for add-ons.    Light Blue Top   Result Value Ref Range    Extra Tube Hold for add-ons.      Medications   sodium chloride 0.9 % flush 10 mL (has no administration in time range)   timolol (TIMOPTIC) 0.5 % ophthalmic solution 1 drop (1 drop Both Eyes Given 9/4/22 0034)   brimonidine (ALPHAGAN) 0.2 % ophthalmic solution 1 drop (1 drop Both Eyes Given 9/4/22 0034)   sodium chloride 0.9 % bolus 1,000 mL (0 mL Intravenous Stopped 9/3/22 2100)   ketorolac (TORADOL) injection 30 mg (30 mg Intravenous Given 9/3/22 2035)   proparacaine (ALCAINE) 0.5 % ophthalmic solution 2 drop (2 drops Both Eyes Given by Other 9/3/22 2224)   acetaZOLAMIDE (DIAMOX) tablet 500 mg (500 mg Oral Given 9/4/22 0033)   dorzolamide (TRUSOPT) 2 % ophthalmic solution 1 drop (1 drop Both Eyes Given 9/4/22 0034)     No results found.    MDM:    Procedures      The case was discussed between the MIRIAM and myself.  Patient  care including, but not limited to ordered imaging, medications, and lab results were reviewed. I then performed the substantive portion of the visit including all aspects of the medical decision making.        Haris Alford DO  00:36 EDT  09/04/22       Haris Alford DO  09/04/22 0036

## 2022-09-04 NOTE — DISCHARGE INSTRUCTIONS
You were seen in the ER today and we are concerned that you may have glaucoma.  Your eye pressures bilaterally were elevated.  We are transferring you to [place] to have your eyes looked at further by an ophthalmologist.

## 2022-10-13 ENCOUNTER — TELEPHONE (OUTPATIENT)
Dept: GASTROENTEROLOGY | Facility: CLINIC | Age: 57
End: 2022-10-13

## 2022-10-16 ENCOUNTER — HOSPITAL ENCOUNTER (EMERGENCY)
Facility: HOSPITAL | Age: 57
Discharge: HOME | End: 2022-10-16
Attending: EMERGENCY MEDICINE | Admitting: EMERGENCY MEDICINE

## 2022-10-16 ENCOUNTER — APPOINTMENT (OUTPATIENT)
Dept: GENERAL RADIOLOGY | Facility: HOSPITAL | Age: 57
End: 2022-10-16

## 2022-10-16 VITALS
SYSTOLIC BLOOD PRESSURE: 132 MMHG | WEIGHT: 201.28 LBS | HEART RATE: 71 BPM | DIASTOLIC BLOOD PRESSURE: 88 MMHG | RESPIRATION RATE: 18 BRPM | OXYGEN SATURATION: 99 % | BODY MASS INDEX: 27.26 KG/M2 | HEIGHT: 72 IN | TEMPERATURE: 97.8 F

## 2022-10-16 DIAGNOSIS — M25.512 ACUTE PAIN OF LEFT SHOULDER: ICD-10-CM
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
## 2022-10-16 DIAGNOSIS — S40.212A ABRASION OF LEFT SHOULDER, INITIAL ENCOUNTER: ICD-10-CM

## 2022-10-16 PROCEDURE — 96372 THER/PROPH/DIAG INJ SC/IM: CPT

## 2022-10-16 PROCEDURE — 25010000002 KETOROLAC TROMETHAMINE PER 15 MG: Performed by: NURSE PRACTITIONER

## 2022-10-16 PROCEDURE — 73030 X-RAY EXAM OF SHOULDER: CPT

## 2022-10-16 PROCEDURE — 99283 EMERGENCY DEPT VISIT LOW MDM: CPT

## 2022-10-16 RX ORDER — KETOROLAC TROMETHAMINE 10 MG/1
10 TABLET, FILM COATED ORAL EVERY 6 HOURS PRN
Qty: 20 TABLET | Refills: 0 | Status: SHIPPED | OUTPATIENT
Start: 2022-10-16 | End: 2022-10-26

## 2022-10-16 RX ORDER — KETOROLAC TROMETHAMINE 30 MG/ML
30 INJECTION, SOLUTION INTRAMUSCULAR; INTRAVENOUS ONCE
Status: COMPLETED | OUTPATIENT
Start: 2022-10-16 | End: 2022-10-16

## 2022-10-16 RX ADMIN — KETOROLAC TROMETHAMINE 30 MG: 30 INJECTION, SOLUTION INTRAMUSCULAR; INTRAVENOUS at 15:46

## 2022-10-20 ENCOUNTER — OFFICE VISIT (OUTPATIENT)
Dept: ORTHOPEDIC SURGERY | Facility: CLINIC | Age: 57
End: 2022-10-20

## 2022-10-20 VITALS — HEIGHT: 72 IN | BODY MASS INDEX: 27.22 KG/M2 | WEIGHT: 201 LBS

## 2022-10-20 DIAGNOSIS — S43.102D SEPARATION OF LEFT ACROMIOCLAVICULAR JOINT, SUBSEQUENT ENCOUNTER: Primary | ICD-10-CM

## 2022-10-20 PROCEDURE — 99213 OFFICE O/P EST LOW 20 MIN: CPT | Performed by: ORTHOPAEDIC SURGERY

## 2022-10-20 RX ORDER — TIMOLOL MALEATE 5 MG/ML
SOLUTION/ DROPS OPHTHALMIC
COMMUNITY
Start: 2022-10-18 | End: 2022-10-26

## 2022-10-20 RX ORDER — BRIMONIDINE TARTRATE 2 MG/ML
1 SOLUTION/ DROPS OPHTHALMIC 2 TIMES DAILY
COMMUNITY
Start: 2022-10-18
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
## 2022-10-21 DIAGNOSIS — S43.102D SEPARATION OF LEFT ACROMIOCLAVICULAR JOINT, SUBSEQUENT ENCOUNTER: Primary | ICD-10-CM

## 2022-10-21 RX ORDER — DICLOFENAC SODIUM 75 MG/1
75 TABLET, DELAYED RELEASE ORAL 2 TIMES DAILY
Qty: 60 TABLET | Refills: 0 | Status: SHIPPED | OUTPATIENT
Start: 2022-10-21 | End: 2022-12-09

## 2022-10-27 ENCOUNTER — ANESTHESIA EVENT (OUTPATIENT)
Dept: GASTROENTEROLOGY | Facility: HOSPITAL | Age: 57
End: 2022-10-27

## 2022-10-27 ENCOUNTER — HOSPITAL ENCOUNTER (OUTPATIENT)
Facility: HOSPITAL | Age: 57
Setting detail: HOSPITAL OUTPATIENT SURGERY
Discharge: HOME | End: 2022-10-27
Attending: INTERNAL MEDICINE | Admitting: INTERNAL MEDICINE

## 2022-10-27 ENCOUNTER — ANESTHESIA (OUTPATIENT)
Dept: GASTROENTEROLOGY | Facility: HOSPITAL | Age: 57
End: 2022-10-27

## 2022-10-27 VITALS
WEIGHT: 198.41 LBS | RESPIRATION RATE: 20 BRPM | HEART RATE: 69 BPM | TEMPERATURE: 97.8 F | BODY MASS INDEX: 26.91 KG/M2 | DIASTOLIC BLOOD PRESSURE: 69 MMHG | SYSTOLIC BLOOD PRESSURE: 105 MMHG | OXYGEN SATURATION: 98 %

## 2022-10-27 DIAGNOSIS — Z86.010 HISTORY OF COLON POLYPS: ICD-10-CM

## 2022-10-27 DIAGNOSIS — K64.8 INTERNAL HEMORRHOIDS: ICD-10-CM

## 2022-10-27 DIAGNOSIS — R19.4 CHANGE IN BOWEL HABITS: ICD-10-CM

## 2022-10-27 PROCEDURE — 45380 COLONOSCOPY AND BIOPSY: CPT | Performed by: INTERNAL MEDICINE

## 2022-10-27 PROCEDURE — 45385 COLONOSCOPY W/LESION REMOVAL: CPT | Performed by: INTERNAL MEDICINE

## 2022-10-27 PROCEDURE — 88305 TISSUE EXAM BY PATHOLOGIST: CPT | Performed by: INTERNAL MEDICINE

## 2022-10-27 PROCEDURE — 25010000002 PROPOFOL 10 MG/ML EMULSION: Performed by: NURSE ANESTHETIST, CERTIFIED REGISTERED

## 2022-10-27 RX ORDER — SODIUM CHLORIDE, SODIUM LACTATE, POTASSIUM CHLORIDE, CALCIUM CHLORIDE 600; 310; 30; 20 MG/100ML; MG/100ML; MG/100ML; MG/100ML
INJECTION, SOLUTION INTRAVENOUS CONTINUOUS PRN
Status: DISCONTINUED | OUTPATIENT
Start: 2022-10-27 | End: 2022-10-27 | Stop reason: SURG

## 2022-10-27 RX ORDER — SODIUM CHLORIDE, SODIUM LACTATE, POTASSIUM CHLORIDE, CALCIUM CHLORIDE 600; 310; 30; 20 MG/100ML; MG/100ML; MG/100ML; MG/100ML
30 INJECTION, SOLUTION INTRAVENOUS CONTINUOUS
Status: DISCONTINUED | OUTPATIENT
Start: 2022-10-27 | End: 2022-10-27 | Stop reason: HOSPADM

## 2022-10-27 RX ORDER — LIDOCAINE HYDROCHLORIDE 20 MG/ML
INJECTION, SOLUTION EPIDURAL; INFILTRATION; INTRACAUDAL; PERINEURAL AS NEEDED
Status: DISCONTINUED | OUTPATIENT
Start: 2022-10-27 | End: 2022-10-27 | Stop reason: SURG

## 2022-10-27 RX ORDER — PROPOFOL 10 MG/ML
VIAL (ML) INTRAVENOUS AS NEEDED
Status: DISCONTINUED | OUTPATIENT
Start: 2022-10-27 | End: 2022-10-27 | Stop reason: SURG

## 2022-10-27 RX ADMIN — LIDOCAINE HYDROCHLORIDE 90 MG: 20 INJECTION, SOLUTION EPIDURAL; INFILTRATION; INTRACAUDAL; PERINEURAL at 10:04

## 2022-10-27 RX ADMIN — PROPOFOL 100 MG: 10 INJECTION, EMULSION INTRAVENOUS at 10:04

## 2022-10-27 RX ADMIN — SODIUM CHLORIDE, POTASSIUM CHLORIDE, SODIUM LACTATE AND CALCIUM CHLORIDE: 600; 310; 30; 20 INJECTION, SOLUTION INTRAVENOUS at 10:02

## 2022-10-27 RX ADMIN — PROPOFOL 150 MCG/KG/MIN: 10 INJECTION, EMULSION INTRAVENOUS at 10:04

## 2022-10-27 NOTE — ANESTHESIA POSTPROCEDURE EVALUATION
Patient: Jose Luis Casper    Procedure Summary     Date: 10/27/22 Room / Location: [facility] ENDOSCOPY 2 / [facility] ENDOSCOPY    Anesthesia Start: 1002 Anesthesia Stop: 1023    Procedure: COLONOSCOPY WITH POLYPECTOMIES Diagnosis:       Change in bowel habits      History of colon polyps      Internal hemorrhoids      (Change in bowel habits [R19.4])      (History of colon polyps [Z86.010])      (Internal hemorrhoids [K64.8])    Surgeons: Val Soriano MD Provider: Dimitry Miles MD    Anesthesia Type: general ASA Status: 2          Anesthesia Type: general    Vitals  Vitals Value Taken Time   BP 99/67 10/27/22 1035   Temp 36.6 °C (97.8 °F) 10/27/22 1026   Pulse 64 10/27/22 1036   Resp 18 10/27/22 1026   SpO2 95 % 10/27/22 1036   Vitals shown include unvalidated device data.        Post Anesthesia Care and Evaluation    Patient location during evaluation: bedside  Patient participation: complete - patient participated  Level of consciousness: awake and alert  Pain management: adequate    Airway patency: patent  Anesthetic complications: No anesthetic complications  PONV Status: none  Cardiovascular status: acceptable  Respiratory status: acceptable  Hydration status: acceptable    Comments: An Anesthesiologist personally participated in the most demanding procedures (including induction and emergence if applicable) in the anesthesia plan, monitored the course of anesthesia administration at frequent intervals and remained physically present and available for immediate diagnosis and treatment of emergencies.

## 2022-10-27 NOTE — ANESTHESIA PREPROCEDURE EVALUATION
Anesthesia Evaluation     Patient summary reviewed and Nursing notes reviewed   no history of anesthetic complications:  NPO Solid Status: > 8 hours  NPO Liquid Status: > 2 hours           Airway   Mallampati: II  TM distance: >3 FB  Neck ROM: full  No difficulty expected  Dental      Pulmonary - negative pulmonary ROS and normal exam    breath sounds clear to auscultation  Cardiovascular - negative cardio ROS and normal exam  Exercise tolerance: good (4-7 METS)    Rhythm: regular  Rate: normal        Neuro/Psych- negative ROS  GI/Hepatic/Renal/Endo    (+)   thyroid problem hypothyroidism    Musculoskeletal (-) negative ROS    Abdominal    Substance History - negative use     OB/GYN negative ob/gyn ROS         Other - negative ROS       ROS/Med Hx Other: PAT Nursing Notes unavailable.                   Anesthesia Plan    ASA 2     general     (Total IV Anesthesia    Patient understands anesthesia not responsible for dental damage.  )  intravenous induction     Anesthetic plan, risks, benefits, and alternatives have been provided, discussed and informed consent has been obtained with: patient.  Pre-procedure education provided  Plan discussed with CRNA.        CODE STATUS:

## 2022-10-28 LAB
CYTO UR: NORMAL
LAB AP CASE REPORT: NORMAL
LAB AP CLINICAL INFORMATION: NORMAL
PATH REPORT.FINAL DX SPEC: NORMAL
PATH REPORT.GROSS SPEC: NORMAL

## 2022-11-01 ENCOUNTER — TELEPHONE (OUTPATIENT)
Dept: GASTROENTEROLOGY | Facility: CLINIC | Age: 57
End: 2022-11-01

## 2022-11-01 NOTE — TELEPHONE ENCOUNTER
----- Message from LEVON Garcia sent at 10/31/2022  8:31 AM EDT -----  Colon polyps benign.  Please place in recall for repeat colonoscopy in 5 years and schedule f/u.   Nostril Rim Text: The closure involved the nostril rim.

## 2022-11-01 NOTE — TELEPHONE ENCOUNTER
Spoke to pt and informed of Denia DOS SANTOS result note and recommendations. F/U apt scheduled on 04/06/2023. Pt verified understanding.     5 year colon recall placed.

## 2022-11-17 ENCOUNTER — OFFICE VISIT (OUTPATIENT)
Dept: ORTHOPEDIC SURGERY | Facility: CLINIC | Age: 57
End: 2022-11-17

## 2022-11-17 VITALS — BODY MASS INDEX: 27.63 KG/M2 | WEIGHT: 204 LBS | HEIGHT: 72 IN

## 2022-11-17 DIAGNOSIS — M25.512 LEFT SHOULDER PAIN, UNSPECIFIED CHRONICITY: Primary | ICD-10-CM

## 2022-11-17 DIAGNOSIS — S43.102D SEPARATION OF LEFT ACROMIOCLAVICULAR JOINT, SUBSEQUENT ENCOUNTER: ICD-10-CM

## 2022-11-17 PROCEDURE — 99213 OFFICE O/P EST LOW 20 MIN: CPT | Performed by: PHYSICIAN ASSISTANT

## 2022-11-17 NOTE — PATIENT INSTRUCTIONS
X-rays taken and reviewed. Discussed with Dr. Piper. Advised to continue present care as patient with minimal pain. Can consider surgery in the future if worsening pain or imaging. Avoid heavy lifting, pushing, or pulling.     Follow up in 3 weeks. Repeat x-rays needed. Call with changes or concerns.

## 2022-11-17 NOTE — PROGRESS NOTES
"Chief Complaint  Follow-up of the Left Shoulder    Subjective      Jose Luis Casper presents to [facility] for low up of left shoulder AC joint separation.  He was initially evaluated in clinic on 10/20/2022, recommended to continue use of sling as needed, avoid heavy or overhead lifting and advised on gentle ROM exercises.  Patient presents today for follow-up reporting \"a large bump to my shoulder\".  States his pain has been well controlled.  He has had no difficulties progressing range of motion    Objective   Allergies   Allergen Reactions   • Acetaminophen Swelling   • Bacitracin Rash       Vital Signs:   Ht 182.9 cm (72\")   Wt 92.5 kg (204 lb)   BMI 27.67 kg/m²       Physical Exam    Constitutional: Awake, alert. Well nourished appearance.    Integumentary: Warm, dry, intact. No obvious rashes.    HENT: Atraumatic, normocephalic.   Respiratory: Non labored respirations .   Cardiovascular: Intact peripheral pulses.    Psychiatric: Normal mood and affect. A&O X3    Ortho Exam  Left shoulder: Large, firm palpable abnormality overlying the left AC joint with reported tenderness to palpation.  No pain with cross body adduction.  Full active forward flexion and shoulder abduction.  Full flexion and extension of the wrist and elbow.  Internal rotation to L2.    Imaging Results (Most Recent)     Procedure Component Value Units Date/Time    XR Scapula Left [[id number]] Resulted: 11/17/22 1649     Updated: 11/17/22 1650    Narrative:      X-Ray Report:  Study: X-rays ordered, taken in the office, and reviewed today.   Site: Left scapula Xray  Indication: AC joint separation  View: Axial and AP/Lateral view(s)  Findings: Study appears to show worsening AC joint separation as compared   to prior study.  Prior studies available for comparison: yes                       Assessment and Plan   Problem List Items Addressed This Visit    None  Visit Diagnoses     Left shoulder pain, unspecified " chronicity    -  Primary    Separation of left acromioclavicular joint, subsequent encounter              Follow Up   No follow-ups on file.  Educated on risk of smoking. Discussed options for smoking cessation.    Patient Instructions   X-rays taken and reviewed. Discussed with Dr. Piper. Advised to continue present care as patient with minimal pain. Can consider surgery in the future if worsening pain or imaging. Avoid heavy lifting, pushing, or pulling.     Follow up in 3 weeks. Repeat x-rays needed. Call with changes or concerns.     Patient was given instructions and counseling regarding his condition or for health maintenance advice. Please see specific information pulled into the AVS if appropriate.

## 2022-12-08 ENCOUNTER — OFFICE VISIT (OUTPATIENT)
Dept: ORTHOPEDIC SURGERY | Facility: CLINIC | Age: 57
End: 2022-12-08

## 2022-12-08 VITALS — BODY MASS INDEX: 27.09 KG/M2 | HEIGHT: 72 IN | WEIGHT: 200 LBS

## 2022-12-08 DIAGNOSIS — S43.102D SEPARATION OF LEFT ACROMIOCLAVICULAR JOINT, SUBSEQUENT ENCOUNTER: ICD-10-CM

## 2022-12-08 DIAGNOSIS — M25.512 LEFT SHOULDER PAIN, UNSPECIFIED CHRONICITY: Primary | ICD-10-CM

## 2022-12-08 PROCEDURE — 99213 OFFICE O/P EST LOW 20 MIN: CPT | Performed by: PHYSICIAN ASSISTANT

## 2022-12-08 NOTE — PROGRESS NOTES
"Chief Complaint  Follow-up of the Left Shoulder    Subjective      Jose Luis Casper presents to [facility] for follow-up of left shoulder AC joint separation, initially treated conservatively with immobilization and sling.  At his last office appointment on 11/17/2022 patient was complaining of \"a large bump to my shoulder\".  X-rays did reveal worsening of AC joint separation.  Options for continued conservative care versus surgery were discussed.  Patient opted to continue with conservative measures.  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  States he is \"still sore\".  He does report that the above-mentioned bump is \"less obvious\".    Objective   Allergies   Allergen Reactions   • Acetaminophen Swelling   • Bacitracin Rash       Vital Signs:   Ht 182.9 cm (72\")   Wt 90.7 kg (200 lb)   BMI 27.12 kg/m²       Physical Exam    Constitutional: Awake, alert. Well nourished appearance.    Integumentary: Warm, dry, intact. No obvious rashes.    HENT: Atraumatic, normocephalic.   Respiratory: Non labored respirations .   Cardiovascular: Intact peripheral pulses.    Psychiatric: Normal mood and affect. A&O X3    Ortho Exam  Left shoulder: Significant improvement noted to palpable abnormality overlying the left AC joint.  Decreased tenderness to palpation.  Full active shoulder forward flexion and abduction.  Full flexion and extension of the wrist and elbow.  Internal rotation to L2.  Good strength to deltoid, triceps, biceps.  Sensation is intact to light touch.  Distal neurovascular intact.    Imaging Results (Most Recent)     Procedure Component Value Units Date/Time    XR Scapula Left [[id number]] Resulted: 12/09/22 1234     Updated: 12/09/22 1236    Narrative:      X-Ray Report:  Study: X-rays ordered, taken in the office, and reviewed today.   Site: Left scapula Xray  Indication: AC joint separation  View: Axial and AP/Lateral view(s)  Findings: Improved appearance " of AC joint separation as compared to prior   exam.  Prior studies available for comparison: yes                       Assessment and Plan   Problem List Items Addressed This Visit    None  Visit Diagnoses     Left shoulder pain, unspecified chronicity    -  Primary    Separation of left acromioclavicular joint, subsequent encounter              Follow Up   Return in about 6 weeks (around 1/19/2023).  Educated on risk of smoking. Discussed options for smoking cessation.    Patient Instructions   X-rays taken and reviewed. Patient with improvement. Referral placed for PT for patient to progress strength and ROM.     Note provided for patient to return to full duty.     Follow up in 6 weeks. Repeat x-rays needed. Call with changes or concerns.     Patient was given instructions and counseling regarding his condition or for health maintenance advice. Please see specific information pulled into the AVS if appropriate.

## 2022-12-08 NOTE — PATIENT INSTRUCTIONS
X-rays taken and reviewed. Patient with improvement. Referral placed for PT for patient to progress strength and ROM.     Note provided for patient to return to full duty.     Follow up in 6 weeks. Repeat x-rays needed. Call with changes or concerns.

## 2022-12-09 ENCOUNTER — OFFICE VISIT (OUTPATIENT)
Dept: FAMILY MEDICINE CLINIC | Facility: CLINIC | Age: 57
End: 2022-12-09

## 2022-12-09 VITALS
DIASTOLIC BLOOD PRESSURE: 74 MMHG | HEART RATE: 81 BPM | HEIGHT: 72 IN | WEIGHT: 205 LBS | TEMPERATURE: 97.6 F | SYSTOLIC BLOOD PRESSURE: 125 MMHG | OXYGEN SATURATION: 98 % | BODY MASS INDEX: 27.77 KG/M2

## 2022-12-09 DIAGNOSIS — R35.0 BENIGN PROSTATIC HYPERPLASIA WITH URINARY FREQUENCY: Primary | ICD-10-CM

## 2022-12-09 DIAGNOSIS — Z00.00 ANNUAL PHYSICAL EXAM: ICD-10-CM

## 2022-12-09 DIAGNOSIS — Z12.5 SCREENING FOR MALIGNANT NEOPLASM OF PROSTATE: ICD-10-CM

## 2022-12-09 DIAGNOSIS — N40.1 BENIGN PROSTATIC HYPERPLASIA WITH URINARY FREQUENCY: Primary | ICD-10-CM

## 2022-12-09 LAB
ALBUMIN SERPL-MCNC: 4.3 G/DL (ref 3.5–5.2)
ALBUMIN/GLOB SERPL: 1.7 G/DL
ALP SERPL-CCNC: 102 U/L (ref 39–117)
ALT SERPL W P-5'-P-CCNC: 13 U/L (ref 1–41)
ANION GAP SERPL CALCULATED.3IONS-SCNC: 7 MMOL/L (ref 5–15)
AST SERPL-CCNC: 19 U/L (ref 1–40)
BASOPHILS # BLD AUTO: 0.02 10*3/MM3 (ref 0–0.2)
BASOPHILS NFR BLD AUTO: 0.3 % (ref 0–1.5)
BILIRUB SERPL-MCNC: 0.4 MG/DL (ref 0–1.2)
BUN SERPL-MCNC: 17 MG/DL (ref 6–20)
BUN/CREAT SERPL: 18.7 (ref 7–25)
CALCIUM SPEC-SCNC: 9.9 MG/DL (ref 8.6–10.5)
CHLORIDE SERPL-SCNC: 103 MMOL/L (ref 98–107)
CHOLEST SERPL-MCNC: 144 MG/DL (ref 0–200)
CO2 SERPL-SCNC: 29 MMOL/L (ref 22–29)
CREAT SERPL-MCNC: 0.91 MG/DL (ref 0.76–1.27)
DEPRECATED RDW RBC AUTO: 41.9 FL (ref 37–54)
EGFRCR SERPLBLD CKD-EPI 2021: 98.3 ML/MIN/1.73
EOSINOPHIL # BLD AUTO: 0.18 10*3/MM3 (ref 0–0.4)
EOSINOPHIL NFR BLD AUTO: 3 % (ref 0.3–6.2)
ERYTHROCYTE [DISTWIDTH] IN BLOOD BY AUTOMATED COUNT: 12.9 % (ref 12.3–15.4)
GLOBULIN UR ELPH-MCNC: 2.5 GM/DL
GLUCOSE SERPL-MCNC: 83 MG/DL (ref 65–99)
HCT VFR BLD AUTO: 44.3 % (ref 37.5–51)
HDLC SERPL-MCNC: 51 MG/DL (ref 40–60)
HGB BLD-MCNC: 15.2 G/DL (ref 13–17.7)
IMM GRANULOCYTES # BLD AUTO: 0.02 10*3/MM3 (ref 0–0.05)
IMM GRANULOCYTES NFR BLD AUTO: 0.3 % (ref 0–0.5)
LDLC SERPL CALC-MCNC: 79 MG/DL (ref 0–100)
LDLC/HDLC SERPL: 1.55 {RATIO}
LYMPHOCYTES # BLD AUTO: 1.84 10*3/MM3 (ref 0.7–3.1)
LYMPHOCYTES NFR BLD AUTO: 31.1 % (ref 19.6–45.3)
MCH RBC QN AUTO: 30.5 PG (ref 26.6–33)
MCHC RBC AUTO-ENTMCNC: 34.3 G/DL (ref 31.5–35.7)
MCV RBC AUTO: 88.8 FL (ref 79–97)
MONOCYTES # BLD AUTO: 0.64 10*3/MM3 (ref 0.1–0.9)
MONOCYTES NFR BLD AUTO: 10.8 % (ref 5–12)
NEUTROPHILS NFR BLD AUTO: 3.22 10*3/MM3 (ref 1.7–7)
NEUTROPHILS NFR BLD AUTO: 54.5 % (ref 42.7–76)
NRBC BLD AUTO-RTO: 0 /100 WBC (ref 0–0.2)
PLATELET # BLD AUTO: 229 10*3/MM3 (ref 140–450)
PMV BLD AUTO: 9.2 FL (ref 6–12)
POTASSIUM SERPL-SCNC: 4.5 MMOL/L (ref 3.5–5.2)
PROT SERPL-MCNC: 6.8 G/DL (ref 6–8.5)
PSA SERPL-MCNC: 0.96 NG/ML (ref 0–4)
RBC # BLD AUTO: 4.99 10*6/MM3 (ref 4.14–5.8)
SODIUM SERPL-SCNC: 139 MMOL/L (ref 136–145)
TRIGL SERPL-MCNC: 69 MG/DL (ref 0–150)
TSH SERPL DL<=0.05 MIU/L-ACNC: 2.01 UIU/ML (ref 0.27–4.2)
VLDLC SERPL-MCNC: 14 MG/DL (ref 5–40)
WBC NRBC COR # BLD: 5.92 10*3/MM3 (ref 3.4–10.8)

## 2022-12-09 PROCEDURE — 99396 PREV VISIT EST AGE 40-64: CPT | Performed by: FAMILY MEDICINE

## 2022-12-09 PROCEDURE — 80061 LIPID PANEL: CPT | Performed by: FAMILY MEDICINE

## 2022-12-09 PROCEDURE — G0103 PSA SCREENING: HCPCS | Performed by: FAMILY MEDICINE

## 2022-12-09 PROCEDURE — 36415 COLL VENOUS BLD VENIPUNCTURE: CPT | Performed by: FAMILY MEDICINE

## 2022-12-09 PROCEDURE — 80050 GENERAL HEALTH PANEL: CPT | Performed by: FAMILY MEDICINE

## 2022-12-09 NOTE — ASSESSMENT & PLAN NOTE
Overall he is doing well except for his recent injury.  He also had an episode of an acute glaucoma episode and had to have a laser iridotomy.  He is following up with ophthalmology on a regular basis for that.  We will get updated routine annual labs.

## 2023-01-19 ENCOUNTER — OFFICE VISIT (OUTPATIENT)
Dept: ORTHOPEDIC SURGERY | Facility: CLINIC | Age: 58
End: 2023-01-19
Payer: COMMERCIAL

## 2023-01-19 VITALS — HEIGHT: 72 IN | WEIGHT: 205 LBS | BODY MASS INDEX: 27.77 KG/M2

## 2023-01-19 DIAGNOSIS — M25.512 LEFT SHOULDER PAIN, UNSPECIFIED CHRONICITY: Primary | ICD-10-CM

## 2023-01-19 DIAGNOSIS — S43.102D SEPARATION OF LEFT ACROMIOCLAVICULAR JOINT, SUBSEQUENT ENCOUNTER: ICD-10-CM

## 2023-01-19 PROCEDURE — 99213 OFFICE O/P EST LOW 20 MIN: CPT | Performed by: PHYSICIAN ASSISTANT

## 2023-01-19 NOTE — PATIENT INSTRUCTIONS
X-rays taken and reviewed. Discussed with Dr. Piper. Advised to continue conservative care. Patient to start PT tomorrow. Advised to continue PT to completion. Continue home exercises.     Follow up in 6 weeks. Repeat x-rays. Call with changes or concerns.

## 2023-01-19 NOTE — PROGRESS NOTES
"Chief Complaint  Pain and Follow-up of the Left Shoulder    Subjective      Jose Luis Casper presents to [facility] for follow-up of left shoulder AC joint separation, initially treated conservatively/nonoperatively with immobilization in sling.  Patient had initially been noted with interval worsening of his left AC joint separation, which had demonstrated improvement and stability by his last office appointment.  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  He does report pain and difficulties with this.  He had previously received a referral to outpatient physical therapy, although reports there was delay with contacting PT and he is scheduled to start this tomorrow.  He has minimal pain with exception of heavy lifting, pushing, or pulling.  Feels he has regained full shoulder ROM.    Objective   Allergies   Allergen Reactions   • Acetaminophen Swelling   • Bacitracin Rash       Vital Signs:   Ht 182.9 cm (72\")   Wt 93 kg (205 lb)   BMI 27.80 kg/m²       Physical Exam    Constitutional: Awake, alert. Well nourished appearance.    Integumentary: Warm, dry, intact. No obvious rashes.    HENT: Atraumatic, normocephalic.   Respiratory: Non labored respirations .   Cardiovascular: Intact peripheral pulses.    Psychiatric: Normal mood and affect. A&O X3    Ortho Exam  Left shoulder: Firm, palpable knot, grossly visible overlying left AC joint.  Full active shoulder forward flexion and abduction.  Full flexion extension of the wrist and elbow.  Internal rotation to back pocket.  Crepitus to AC joint with ROM.  Good strength to deltoid, triceps, biceps.  Sensation intact to light touch.  Distal neurovascular intact.    Imaging Results (Most Recent)     Procedure Component Value Units Date/Time    XR Scapula Left [[id number]] Resulted: 01/20/23 0747     Updated: 01/20/23 0748    " Narrative:      X-Ray Report:  Study: X-rays ordered, taken in the office, and reviewed today.   Site: Left scapula Xray  Indication: AC joint separation  View: AP/Lateral view(s)  Findings: Apparent worsening of AC joint separation as compared to   previous exams.  Prior studies available for comparison: yes                       Assessment and Plan   Problem List Items Addressed This Visit    None  Visit Diagnoses     Left shoulder pain, unspecified chronicity    -  Primary    Relevant Orders    XR Scapula Left (Completed)    Separation of left acromioclavicular joint, subsequent encounter            Follow Up   Return in about 6 weeks (around 3/2/2023).  Patient is a non-smoker.  Did not discuss options for smoking cessation.    Patient Instructions   X-rays taken and reviewed. Discussed with Dr. Piper. Advised to continue conservative care. Patient to start PT tomorrow. Advised to continue PT to completion. Continue home exercises.     Follow up in 6 weeks. Repeat x-rays. Call with changes or concerns.     Patient was given instructions and counseling regarding his condition or for health maintenance advice. Please see specific information pulled into the AVS if appropriate.

## 2023-03-02 ENCOUNTER — OFFICE VISIT (OUTPATIENT)
Dept: ORTHOPEDIC SURGERY | Facility: CLINIC | Age: 58
End: 2023-03-02
Payer: COMMERCIAL

## 2023-03-02 VITALS — HEIGHT: 72 IN | BODY MASS INDEX: 26.28 KG/M2 | WEIGHT: 194 LBS

## 2023-03-02 DIAGNOSIS — S43.102D SEPARATION OF LEFT ACROMIOCLAVICULAR JOINT, SUBSEQUENT ENCOUNTER: ICD-10-CM

## 2023-03-02 DIAGNOSIS — M25.512 LEFT SHOULDER PAIN, UNSPECIFIED CHRONICITY: Primary | ICD-10-CM

## 2023-03-02 PROCEDURE — 99213 OFFICE O/P EST LOW 20 MIN: CPT | Performed by: PHYSICIAN ASSISTANT

## 2023-03-02 NOTE — PATIENT INSTRUCTIONS
X-rays stable. Discussed with patient. He is doing very well and has completed PT. Advised to continue HEP.    Follow up as needed for new or worsening symptoms. Call with changes or concerns.

## 2023-03-02 NOTE — PROGRESS NOTES
"Chief Complaint  Pain and Follow-up of the Left Shoulder    Subjective      Jose Luis Casper presents to [facility] for follow-up of left shoulder AC joint separation, initially treated conservatively/nonoperatively with immobilization and sling.  He was noted with interval worsening of left AC joint separation, however, since this is remained stable.  He has been participating in outpatient physical therapy, although reports this was discontinued on 2/2/2023 due to insurance denial for additional PT.  He feels he has been able to return to most of his baseline activities without difficulties.  Does report that he has \"good days and bad days\".  Reports more often than not he has no complaints from his left shoulder.    Objective   Allergies   Allergen Reactions   • Acetaminophen Swelling   • Bacitracin Rash       Vital Signs:   Ht 182.9 cm (72\")   Wt 88 kg (194 lb)   BMI 26.31 kg/m²       Physical Exam    Constitutional: Awake, alert. Well nourished appearance.    Integumentary: Warm, dry, intact. No obvious rashes.    HENT: Atraumatic, normocephalic.   Respiratory: Non labored respirations .   Cardiovascular: Intact peripheral pulses.    Psychiatric: Normal mood and affect. A&O X3    Ortho Exam  Left shoulder: Skin is warm, dry, and intact.  Firm, palpable knot distally palpable overlying the left AC joint.  Full active shoulder forward flexion and abduction.  Full flexion and extension of the wrist and elbow.  Internal rotation to back pocket.  Crepitus to AC joint with ROM.  Pain with cross body adduction.  Sensation intact to light touch.  Distal neurovascular intact.    Imaging Results (Most Recent)     Procedure Component Value Units Date/Time    XR Scapula Left [[id number]] Resulted: 03/06/23 1308     Updated: 03/06/23 1309    Narrative:      X-Ray Report:  Study: X-rays ordered, taken in the office, and reviewed today.   Site: Left scapula Xray  Indication: AC joint " separation  View: Axial and AP/Lateral view(s)  Findings: Stable appearance of left AC joint separation.  Prior studies available for comparison: yes                     Assessment and Plan   Problem List Items Addressed This Visit    None  Visit Diagnoses     Left shoulder pain, unspecified chronicity    -  Primary    Relevant Orders    XR Scapula Left (Completed)    Separation of left acromioclavicular joint, subsequent encounter            Follow Up   Return if symptoms worsen or fail to improve.   Patient is a non-smoker.  Did not discuss options for smoking cessation.    Patient Instructions   X-rays stable. Discussed with patient. He is doing very well and has completed PT. Advised to continue HEP.    Follow up as needed for new or worsening symptoms. Call with changes or concerns.     Patient was given instructions and counseling regarding his condition or for health maintenance advice. Please see specific information pulled into the AVS if appropriate.

## 2023-04-06 ENCOUNTER — OFFICE VISIT (OUTPATIENT)
Dept: GASTROENTEROLOGY | Facility: CLINIC | Age: 58
End: 2023-04-06
Payer: COMMERCIAL

## 2023-04-06 VITALS
WEIGHT: 201 LBS | HEIGHT: 72 IN | BODY MASS INDEX: 27.22 KG/M2 | DIASTOLIC BLOOD PRESSURE: 77 MMHG | SYSTOLIC BLOOD PRESSURE: 118 MMHG | HEART RATE: 71 BPM

## 2023-04-06 DIAGNOSIS — K64.8 INTERNAL HEMORRHOIDS: Primary | ICD-10-CM

## 2023-04-06 DIAGNOSIS — D12.6 ADENOMATOUS POLYP OF COLON, UNSPECIFIED PART OF COLON: ICD-10-CM

## 2023-04-06 PROCEDURE — 99213 OFFICE O/P EST LOW 20 MIN: CPT | Performed by: NURSE PRACTITIONER

## 2023-04-06 NOTE — PROGRESS NOTES
Chief Complaint     Diarrhea and Hemorrhoids    History of Present Illness     Jose Luis Casper is a 58 y.o. male who presents to [facility] for follow-up of change in bowel habits, history of colon polyps and internal hemorrhoids.    He reports that stools are smaller in diameter as before.  Will sometimes feel that he's not getting empty.  Having 2-3 bowel movements per day.  He reports that symptoms were somewhat improved when he used Anusol.     History      Past Medical History:   Diagnosis Date   • Arthritis    • Glaucoma    • Limb swelling    • Olecranon bursitis of left elbow 08/27/2015    aftercare following surgery   • Olecranon bursitis of left elbow 08/03/2015   • Olecranon bursitis of left elbow 09/09/2015    post musc/ske surgery   • Seasonal allergies    • Talus fracture 11/23/2017    right ankle lateral process     Past Surgical History:   Procedure Laterality Date   • COLONOSCOPY     • COLONOSCOPY N/A 10/27/2022    Procedure: COLONOSCOPY WITH POLYPECTOMIES;  Surgeon: Val Soriano MD;  Location: [facility];  Service: Gastroenterology;  Laterality: N/A;  COLON POLYPS, HEMORRHOIDS   • ENDOSCOPY  2000   • EYE SURGERY Bilateral     laser  cataract surgery   • KNEE ACL RECONSTRUCTION     • LUNG BIOPSY     • OTHER SURGICAL HISTORY N/A     joint surgery left ACL repair   • TOE SURGERY       Family History   Problem Relation Age of Onset   • Cancer Mother         unspecified   • Heart disease Father    • Diabetes Father         unspecified type   • Arthritis Father    • Arthritis Sister    • Malig Hyperthermia Neg Hx         Current Medications       Current Outpatient Medications:   •  brimonidine (ALPHAGAN) 0.2 % ophthalmic solution, Administer 1 drop to both eyes 2 (Two) Times a Day., Disp: , Rfl:   •  tamsulosin (FLOMAX) 0.4 MG capsule 24 hr capsule, Take 1 capsule by mouth Daily. (Patient taking differently: Take 1 capsule by mouth Every Night.), Disp:  "90 capsule, Rfl: 3     Allergies     Allergies   Allergen Reactions   • Acetaminophen Swelling   • Bacitracin Rash       Social History       Social History     Social History Narrative   • Not on file         Objective       /77 (BP Location: Left arm, Patient Position: Sitting, Cuff Size: Adult)   Pulse 71   Ht 182.9 cm (72\")   Wt 91.2 kg (201 lb)   BMI 27.26 kg/m²       Physical Exam    Results       Result Review :    The following data was reviewed by: LEVON Garcia on 04/06/2023:    CBC w/diff    CBC w/Diff 9/3/22 12/9/22   WBC 8.26 5.92   RBC 5.14 4.99   Hemoglobin 15.6 15.2   Hematocrit 44.6 44.3   MCV 86.8 88.8   MCH 30.4 30.5   MCHC 35.0 34.3   RDW 12.5 12.9   Platelets 204 229   Neutrophil Rel % 83.1 (A) 54.5   Immature Granulocyte Rel % 0.2 0.3   Lymphocyte Rel % 9.6 (A) 31.1   Monocyte Rel % 6.8 10.8   Eosinophil Rel % 0.2 (A) 3.0   Basophil Rel % 0.1 0.3   (A) Abnormal value            CMP    CMP 9/3/22 12/9/22   Glucose 127 (A) 83   BUN 13 17   Creatinine 0.70 (A) 0.91   eGFR 107.5 98.3   Sodium 139 139   Potassium 4.6 4.5   Chloride 103 103   Calcium 9.4 9.9   Total Protein 7.0 6.8   Albumin 4.00 4.30   Globulin 3.0 2.5   Total Bilirubin 0.8 0.4   Alkaline Phosphatase 100 102   AST (SGOT) 21 19   ALT (SGPT) 16 13   Albumin/Globulin Ratio 1.3 1.7   BUN/Creatinine Ratio 18.6 18.7   Anion Gap 9.1 7.0   (A) Abnormal value       Comments are available for some flowsheets but are not being displayed.           10/27/2022 Colonoscopy-colonoscopy-perianal and digital rectal exam were normal.  6 mm polyp in cecum-tubular adenoma, removed.  3 mm polyp in the ascending colon-hyperplastic, completely removed.  4 mm polyp in the sigmoid colon-benign, negative for adenoma.  Diverticulosis in the sigmoid colon.  Internal hemorrhoids.  Recommend repeat colonoscopy in 5 years.             Assessment and Plan              Diagnoses and all orders for this visit:    1. Internal hemorrhoids " (Primary)    2. Adenomatous polyp of colon, unspecified part of colon      Recommend daily fiber supplement and Anusol as needed for hemorrhoids.      Follow Up     Follow Up   Return if symptoms worsen or fail to improve.  Patient was given instructions and counseling regarding his condition or for health maintenance advice. Please see specific information pulled into the AVS if appropriate.

## 2023-04-24 ENCOUNTER — OFFICE VISIT (OUTPATIENT)
Dept: FAMILY MEDICINE CLINIC | Facility: CLINIC | Age: 58
End: 2023-04-24
Payer: COMMERCIAL

## 2023-04-24 VITALS
WEIGHT: 200.6 LBS | OXYGEN SATURATION: 98 % | SYSTOLIC BLOOD PRESSURE: 124 MMHG | TEMPERATURE: 97.9 F | DIASTOLIC BLOOD PRESSURE: 71 MMHG | HEART RATE: 79 BPM | BODY MASS INDEX: 27.21 KG/M2

## 2023-04-24 DIAGNOSIS — J40 BRONCHITIS: Primary | ICD-10-CM

## 2023-04-24 RX ORDER — METHYLPREDNISOLONE ACETATE 80 MG/ML
80 INJECTION, SUSPENSION INTRA-ARTICULAR; INTRALESIONAL; INTRAMUSCULAR; SOFT TISSUE ONCE
Status: COMPLETED | OUTPATIENT
Start: 2023-04-24 | End: 2023-04-24

## 2023-04-24 RX ORDER — GUAIFENESIN 600 MG/1
1200 TABLET, EXTENDED RELEASE ORAL 2 TIMES DAILY
COMMUNITY

## 2023-04-24 RX ADMIN — METHYLPREDNISOLONE ACETATE 80 MG: 80 INJECTION, SUSPENSION INTRA-ARTICULAR; INTRALESIONAL; INTRAMUSCULAR; SOFT TISSUE at 08:58
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
## 2023-05-17 RX ORDER — TAMSULOSIN HYDROCHLORIDE 0.4 MG/1
1 CAPSULE ORAL NIGHTLY
Qty: 90 CAPSULE | Refills: 3 | Status: SHIPPED | OUTPATIENT
Start: 2023-05-17

## 2024-01-05 RX ORDER — TAMSULOSIN HYDROCHLORIDE 0.4 MG/1
1 CAPSULE ORAL NIGHTLY
Qty: 90 CAPSULE | Refills: 3 | Status: SHIPPED | OUTPATIENT
Start: 2024-01-05

## 2024-02-02 ENCOUNTER — OFFICE VISIT (OUTPATIENT)
Dept: FAMILY MEDICINE CLINIC | Facility: CLINIC | Age: 59
End: 2024-02-02
Payer: COMMERCIAL

## 2024-02-02 VITALS
WEIGHT: 207 LBS | TEMPERATURE: 97 F | HEART RATE: 78 BPM | SYSTOLIC BLOOD PRESSURE: 117 MMHG | OXYGEN SATURATION: 97 % | BODY MASS INDEX: 28.04 KG/M2 | HEIGHT: 72 IN | DIASTOLIC BLOOD PRESSURE: 74 MMHG

## 2024-02-02 DIAGNOSIS — Z11.59 NEED FOR HEPATITIS C SCREENING TEST: ICD-10-CM

## 2024-02-02 DIAGNOSIS — Z12.5 SCREENING FOR MALIGNANT NEOPLASM OF PROSTATE: ICD-10-CM

## 2024-02-02 DIAGNOSIS — G89.29 CHRONIC RIGHT SHOULDER PAIN: ICD-10-CM

## 2024-02-02 DIAGNOSIS — Z00.00 ANNUAL PHYSICAL EXAM: Primary | ICD-10-CM

## 2024-02-02 DIAGNOSIS — M25.511 CHRONIC RIGHT SHOULDER PAIN: ICD-10-CM

## 2024-02-02 LAB
ALBUMIN SERPL-MCNC: 4.1 G/DL (ref 3.5–5.2)
ALBUMIN/GLOB SERPL: 1.9 G/DL
ALP SERPL-CCNC: 81 U/L (ref 39–117)
ALT SERPL W P-5'-P-CCNC: 14 U/L (ref 1–41)
ANION GAP SERPL CALCULATED.3IONS-SCNC: 10 MMOL/L (ref 5–15)
AST SERPL-CCNC: 18 U/L (ref 1–40)
BASOPHILS # BLD AUTO: 0.03 10*3/MM3 (ref 0–0.2)
BASOPHILS NFR BLD AUTO: 0.6 % (ref 0–1.5)
BILIRUB SERPL-MCNC: 0.5 MG/DL (ref 0–1.2)
BUN SERPL-MCNC: 21 MG/DL (ref 6–20)
BUN/CREAT SERPL: 20.4 (ref 7–25)
CALCIUM SPEC-SCNC: 9.6 MG/DL (ref 8.6–10.5)
CHLORIDE SERPL-SCNC: 105 MMOL/L (ref 98–107)
CHOLEST SERPL-MCNC: 142 MG/DL (ref 0–200)
CO2 SERPL-SCNC: 26 MMOL/L (ref 22–29)
CREAT SERPL-MCNC: 1.03 MG/DL (ref 0.76–1.27)
DEPRECATED RDW RBC AUTO: 40.5 FL (ref 37–54)
EGFRCR SERPLBLD CKD-EPI 2021: 83.7 ML/MIN/1.73
EOSINOPHIL # BLD AUTO: 0.24 10*3/MM3 (ref 0–0.4)
EOSINOPHIL NFR BLD AUTO: 4.8 % (ref 0.3–6.2)
ERYTHROCYTE [DISTWIDTH] IN BLOOD BY AUTOMATED COUNT: 12.7 % (ref 12.3–15.4)
GLOBULIN UR ELPH-MCNC: 2.2 GM/DL
GLUCOSE SERPL-MCNC: 76 MG/DL (ref 65–99)
HCT VFR BLD AUTO: 43.7 % (ref 37.5–51)
HCV AB SER DONR QL: NORMAL
HDLC SERPL-MCNC: 43 MG/DL (ref 40–60)
HGB BLD-MCNC: 15 G/DL (ref 13–17.7)
IMM GRANULOCYTES # BLD AUTO: 0.01 10*3/MM3 (ref 0–0.05)
IMM GRANULOCYTES NFR BLD AUTO: 0.2 % (ref 0–0.5)
LDLC SERPL CALC-MCNC: 87 MG/DL (ref 0–100)
LDLC/HDLC SERPL: 2.05 {RATIO}
LYMPHOCYTES # BLD AUTO: 1.41 10*3/MM3 (ref 0.7–3.1)
LYMPHOCYTES NFR BLD AUTO: 27.9 % (ref 19.6–45.3)
MCH RBC QN AUTO: 30.4 PG (ref 26.6–33)
MCHC RBC AUTO-ENTMCNC: 34.3 G/DL (ref 31.5–35.7)
MCV RBC AUTO: 88.6 FL (ref 79–97)
MONOCYTES # BLD AUTO: 0.62 10*3/MM3 (ref 0.1–0.9)
MONOCYTES NFR BLD AUTO: 12.3 % (ref 5–12)
NEUTROPHILS NFR BLD AUTO: 2.74 10*3/MM3 (ref 1.7–7)
NEUTROPHILS NFR BLD AUTO: 54.2 % (ref 42.7–76)
NRBC BLD AUTO-RTO: 0 /100 WBC (ref 0–0.2)
PLATELET # BLD AUTO: 228 10*3/MM3 (ref 140–450)
PMV BLD AUTO: 9.7 FL (ref 6–12)
POTASSIUM SERPL-SCNC: 4 MMOL/L (ref 3.5–5.2)
PROT SERPL-MCNC: 6.3 G/DL (ref 6–8.5)
PSA SERPL-MCNC: 0.74 NG/ML (ref 0–4)
RBC # BLD AUTO: 4.93 10*6/MM3 (ref 4.14–5.8)
SODIUM SERPL-SCNC: 141 MMOL/L (ref 136–145)
TRIGL SERPL-MCNC: 55 MG/DL (ref 0–150)
TSH SERPL DL<=0.05 MIU/L-ACNC: 1.67 UIU/ML (ref 0.27–4.2)
VLDLC SERPL-MCNC: 12 MG/DL (ref 5–40)
WBC NRBC COR # BLD AUTO: 5.05 10*3/MM3 (ref 3.4–10.8)

## 2024-02-02 PROCEDURE — 80061 LIPID PANEL: CPT | Performed by: FAMILY MEDICINE

## 2024-02-02 PROCEDURE — G0103 PSA SCREENING: HCPCS | Performed by: FAMILY MEDICINE

## 2024-02-02 PROCEDURE — 99396 PREV VISIT EST AGE 40-64: CPT | Performed by: FAMILY MEDICINE

## 2024-02-02 PROCEDURE — 86803 HEPATITIS C AB TEST: CPT | Performed by: FAMILY MEDICINE

## 2024-02-02 PROCEDURE — 80050 GENERAL HEALTH PANEL: CPT | Performed by: FAMILY MEDICINE

## 2024-02-02 NOTE — ASSESSMENT & PLAN NOTE
His exam today is normal.  His probably just due to continued osteoarthritis.  He will use any anti-inflammatory on an as-needed basis.

## 2024-02-02 NOTE — PROGRESS NOTES
Chief Complaint   Patient presents with    Annual Exam     C/O right shoulder pain         Subjective     Jose Luis Casper  has a past medical history of Arthritis, Glaucoma, Limb swelling, Olecranon bursitis of left elbow (08/27/2015), Olecranon bursitis of left elbow (08/03/2015), Olecranon bursitis of left elbow (09/09/2015), Seasonal allergies, and Talus fracture (11/23/2017).    Annual exam-overall he states he is doing well.  He does complain of some shoulder pain.    Shoulder pain-he has chronic right shoulder pain.  He states for the most part it bothers him more at nighttime when he goes to bed.  He is seen Been in the past.  He does have some AC joint arthritis.  He had had it injected several times with some transient benefit.        PHQ-2 Depression Screening  Little interest or pleasure in doing things?     Feeling down, depressed, or hopeless?     PHQ-2 Total Score     PHQ-9 Depression Screening  Little interest or pleasure in doing things?     Feeling down, depressed, or hopeless?     Trouble falling or staying asleep, or sleeping too much?     Feeling tired or having little energy?     Poor appetite or overeating?     Feeling bad about yourself - or that you are a failure or have let yourself or your family down?     Trouble concentrating on things, such as reading the newspaper or watching television?     Moving or speaking so slowly that other people could have noticed? Or the opposite - being so fidgety or restless that you have been moving around a lot more than usual?     Thoughts that you would be better off dead, or of hurting yourself in some way?     PHQ-9 Total Score     If you checked off any problems, how difficult have these problems made it for you to do your work, take care of things at home, or get along with other people?       Allergies   Allergen Reactions    Acetaminophen Swelling    Bacitracin Rash       Prior to Admission medications    Medication Sig Start Date End Date Taking?  Authorizing Provider   brimonidine (ALPHAGAN) 0.2 % ophthalmic solution Administer 1 drop to both eyes 2 (Two) Times a Day. 10/18/22  Yes ProviderMatthew MD   guaiFENesin (MUCINEX) 600 MG 12 hr tablet Take 2 tablets by mouth 2 (Two) Times a Day.   Yes Provider, MD Matthew   tamsulosin (FLOMAX) 0.4 MG capsule 24 hr capsule Take 1 capsule by mouth Every Night. 1/5/24  Yes Steve Frederick DO        Patient Active Problem List   Diagnosis    Change in bowel habits    Benign prostatic hyperplasia with urinary frequency    History of colon polyps    Internal hemorrhoids    Annual physical exam    Screening for malignant neoplasm of prostate    Chronic right shoulder pain        Past Surgical History:   Procedure Laterality Date    COLONOSCOPY      COLONOSCOPY N/A 10/27/2022    Procedure: COLONOSCOPY WITH POLYPECTOMIES;  Surgeon: Val Soriano MD;  Location: [facility];  Service: Gastroenterology;  Laterality: N/A;  COLON POLYPS, HEMORRHOIDS    ENDOSCOPY  2000    EYE SURGERY Bilateral     laser  cataract surgery    KNEE ACL RECONSTRUCTION      LUNG BIOPSY      OTHER SURGICAL HISTORY N/A     joint surgery left ACL repair    TOE SURGERY         Social History     Socioeconomic History    Marital status: Single   Tobacco Use    Smoking status: Never    Smokeless tobacco: Never   Vaping Use    Vaping Use: Never used   Substance and Sexual Activity    Alcohol use: Yes     Comment: occ    Drug use: Never       Family History   Problem Relation Age of Onset    Cancer Mother         unspecified    Heart disease Father     Diabetes Father         unspecified type    Arthritis Father     Arthritis Sister     Malig Hyperthermia Neg Hx        Family history, surgical history, past medical history, Allergies and meds reviewed with patient today and updated in [facility] EMR.     ROS:  Review of Systems   Constitutional:  Negative for fatigue.   HENT:  Negative for congestion, postnasal drip and rhinorrhea.   "  Eyes:  Negative for blurred vision and visual disturbance.   Respiratory:  Negative for cough, chest tightness, shortness of breath and wheezing.    Cardiovascular:  Negative for chest pain and palpitations.   Gastrointestinal:  Positive for diarrhea. Negative for constipation.   Musculoskeletal:  Positive for arthralgias.   Allergic/Immunologic: Negative for environmental allergies.   Neurological:  Negative for headache.   Psychiatric/Behavioral:  Negative for depressed mood. The patient is not nervous/anxious.        OBJECTIVE:  Vitals:    02/02/24 0906   BP: 117/74   BP Location: Left arm   Patient Position: Sitting   Pulse: 78   Temp: 97 °F (36.1 °C)   SpO2: 97%   Weight: 93.9 kg (207 lb)   Height: 182.9 cm (72\")     No results found.   Body mass index is 28.07 kg/m².  No LMP for male patient.    Physical Exam  Vitals and nursing note reviewed.   Constitutional:       General: He is not in acute distress.     Appearance: Normal appearance. He is normal weight.   HENT:      Head: Normocephalic.      Right Ear: Tympanic membrane, ear canal and external ear normal.      Left Ear: Tympanic membrane, ear canal and external ear normal.      Nose: Nose normal.      Mouth/Throat:      Mouth: Mucous membranes are moist.      Pharynx: Oropharynx is clear.   Eyes:      General: No scleral icterus.     Conjunctiva/sclera: Conjunctivae normal.      Pupils: Pupils are equal, round, and reactive to light.   Cardiovascular:      Rate and Rhythm: Normal rate and regular rhythm.      Pulses: Normal pulses.      Heart sounds: Normal heart sounds. No murmur heard.  Pulmonary:      Effort: Pulmonary effort is normal.      Breath sounds: Normal breath sounds. No wheezing, rhonchi or rales.   Musculoskeletal:      Right shoulder: No deformity, tenderness or crepitus. Normal range of motion. Normal strength.      Cervical back: Neck supple. No rigidity or tenderness.   Lymphadenopathy:      Cervical: No cervical adenopathy. "   Skin:     General: Skin is warm and dry.      Coloration: Skin is not jaundiced.      Findings: No rash.   Neurological:      General: No focal deficit present.      Mental Status: He is alert and oriented to person, place, and time.   Psychiatric:         Mood and Affect: Mood normal.         Thought Content: Thought content normal.         Judgment: Judgment normal.         Procedures    No visits with results within 30 Day(s) from this visit.   Latest known visit with results is:   Office Visit on 12/09/2022   Component Date Value Ref Range Status    Glucose 12/09/2022 83  65 - 99 mg/dL Final    BUN 12/09/2022 17  6 - 20 mg/dL Final    Creatinine 12/09/2022 0.91  0.76 - 1.27 mg/dL Final    Sodium 12/09/2022 139  136 - 145 mmol/L Final    Potassium 12/09/2022 4.5  3.5 - 5.2 mmol/L Final    Chloride 12/09/2022 103  98 - 107 mmol/L Final    CO2 12/09/2022 29.0  22.0 - 29.0 mmol/L Final    Calcium 12/09/2022 9.9  8.6 - 10.5 mg/dL Final    Total Protein 12/09/2022 6.8  6.0 - 8.5 g/dL Final    Albumin 12/09/2022 4.30  3.50 - 5.20 g/dL Final    ALT (SGPT) 12/09/2022 13  1 - 41 U/L Final    AST (SGOT) 12/09/2022 19  1 - 40 U/L Final    Alkaline Phosphatase 12/09/2022 102  39 - 117 U/L Final    Total Bilirubin 12/09/2022 0.4  0.0 - 1.2 mg/dL Final    Globulin 12/09/2022 2.5  gm/dL Final    A/G Ratio 12/09/2022 1.7  g/dL Final    BUN/Creatinine Ratio 12/09/2022 18.7  7.0 - 25.0 Final    Anion Gap 12/09/2022 7.0  5.0 - 15.0 mmol/L Final    eGFR 12/09/2022 98.3  >60.0 mL/min/1.73 Final    National Kidney Foundation and American Society of Nephrology (ASN) Task Force recommended calculation based on the Chronic Kidney Disease Epidemiology Collaboration (CKD-EPI) equation refit without adjustment for race.    TSH 12/09/2022 2.010  0.270 - 4.200 uIU/mL Final    Total Cholesterol 12/09/2022 144  0 - 200 mg/dL Final    Triglycerides 12/09/2022 69  0 - 150 mg/dL Final    HDL Cholesterol 12/09/2022 51  40 - 60 mg/dL Final    LDL  Cholesterol  12/09/2022 79  0 - 100 mg/dL Final    VLDL Cholesterol 12/09/2022 14  5 - 40 mg/dL Final    LDL/HDL Ratio 12/09/2022 1.55   Final    PSA 12/09/2022 0.962  0.000 - 4.000 ng/mL Final    WBC 12/09/2022 5.92  3.40 - 10.80 10*3/mm3 Final    RBC 12/09/2022 4.99  4.14 - 5.80 10*6/mm3 Final    Hemoglobin 12/09/2022 15.2  13.0 - 17.7 g/dL Final    Hematocrit 12/09/2022 44.3  37.5 - 51.0 % Final    MCV 12/09/2022 88.8  79.0 - 97.0 fL Final    MCH 12/09/2022 30.5  26.6 - 33.0 pg Final    MCHC 12/09/2022 34.3  31.5 - 35.7 g/dL Final    RDW 12/09/2022 12.9  12.3 - 15.4 % Final    RDW-SD 12/09/2022 41.9  37.0 - 54.0 fl Final    MPV 12/09/2022 9.2  6.0 - 12.0 fL Final    Platelets 12/09/2022 229  140 - 450 10*3/mm3 Final    Neutrophil % 12/09/2022 54.5  42.7 - 76.0 % Final    Lymphocyte % 12/09/2022 31.1  19.6 - 45.3 % Final    Monocyte % 12/09/2022 10.8  5.0 - 12.0 % Final    Eosinophil % 12/09/2022 3.0  0.3 - 6.2 % Final    Basophil % 12/09/2022 0.3  0.0 - 1.5 % Final    Immature Grans % 12/09/2022 0.3  0.0 - 0.5 % Final    Neutrophils, Absolute 12/09/2022 3.22  1.70 - 7.00 10*3/mm3 Final    Lymphocytes, Absolute 12/09/2022 1.84  0.70 - 3.10 10*3/mm3 Final    Monocytes, Absolute 12/09/2022 0.64  0.10 - 0.90 10*3/mm3 Final    Eosinophils, Absolute 12/09/2022 0.18  0.00 - 0.40 10*3/mm3 Final    Basophils, Absolute 12/09/2022 0.02  0.00 - 0.20 10*3/mm3 Final    Immature Grans, Absolute 12/09/2022 0.02  0.00 - 0.05 10*3/mm3 Final    nRBC 12/09/2022 0.0  0.0 - 0.2 /100 WBC Final       ASSESSMENT/ PLAN:    Diagnoses and all orders for this visit:    1. Annual physical exam (Primary)  -     Comprehensive Metabolic Panel  -     CBC & Differential  -     TSH  -     Lipid Panel  -     PSA Screen    2. Need for hepatitis C screening test  -     Hepatitis C Antibody; Future    3. Screening for malignant neoplasm of prostate  -     PSA Screen    4. Chronic right shoulder pain  Assessment & Plan:  His exam today is normal.  His  probably just due to continued osteoarthritis.  He will use any anti-inflammatory on an as-needed basis.                 Orders Placed Today:     No orders of the defined types were placed in this encounter.       Management Plan:     An After Visit Summary was printed and given to the patient at discharge.    Follow-up: Return in about 1 year (around 2/2/2025) for Annual physical.    Steve Frederick, DO 2/2/2024 09:41 EST  This note was electronically signed.

## 2025-02-20 RX ORDER — TAMSULOSIN HYDROCHLORIDE 0.4 MG/1
1 CAPSULE ORAL NIGHTLY
Qty: 90 CAPSULE | Refills: 3 | Status: SHIPPED | OUTPATIENT
Start: 2025-02-20

## 2025-03-14 ENCOUNTER — OFFICE VISIT (OUTPATIENT)
Dept: FAMILY MEDICINE CLINIC | Facility: CLINIC | Age: 60
End: 2025-03-14
Payer: COMMERCIAL

## 2025-03-14 VITALS
TEMPERATURE: 96.7 F | WEIGHT: 201 LBS | HEART RATE: 68 BPM | SYSTOLIC BLOOD PRESSURE: 128 MMHG | DIASTOLIC BLOOD PRESSURE: 75 MMHG | OXYGEN SATURATION: 97 % | BODY MASS INDEX: 27.22 KG/M2 | HEIGHT: 72 IN

## 2025-03-14 DIAGNOSIS — M25.511 CHRONIC RIGHT SHOULDER PAIN: ICD-10-CM

## 2025-03-14 DIAGNOSIS — G89.29 CHRONIC RIGHT SHOULDER PAIN: ICD-10-CM

## 2025-03-14 DIAGNOSIS — Z12.5 SCREENING FOR MALIGNANT NEOPLASM OF PROSTATE: ICD-10-CM

## 2025-03-14 DIAGNOSIS — Z00.00 ANNUAL PHYSICAL EXAM: Primary | ICD-10-CM

## 2025-03-14 LAB
ALBUMIN SERPL-MCNC: 4 G/DL (ref 3.5–5.2)
ALBUMIN/GLOB SERPL: 1.5 G/DL
ALP SERPL-CCNC: 92 U/L (ref 39–117)
ALT SERPL W P-5'-P-CCNC: 16 U/L (ref 1–41)
ANION GAP SERPL CALCULATED.3IONS-SCNC: 12 MMOL/L (ref 5–15)
AST SERPL-CCNC: 21 U/L (ref 1–40)
BASOPHILS # BLD AUTO: 0.04 10*3/MM3 (ref 0–0.2)
BASOPHILS NFR BLD AUTO: 0.6 % (ref 0–1.5)
BILIRUB SERPL-MCNC: 0.5 MG/DL (ref 0–1.2)
BUN SERPL-MCNC: 18 MG/DL (ref 8–23)
BUN/CREAT SERPL: 19.1 (ref 7–25)
CALCIUM SPEC-SCNC: 9.3 MG/DL (ref 8.6–10.5)
CHLORIDE SERPL-SCNC: 106 MMOL/L (ref 98–107)
CHOLEST SERPL-MCNC: 155 MG/DL (ref 0–200)
CO2 SERPL-SCNC: 26 MMOL/L (ref 22–29)
CREAT SERPL-MCNC: 0.94 MG/DL (ref 0.76–1.27)
DEPRECATED RDW RBC AUTO: 44.5 FL (ref 37–54)
EGFRCR SERPLBLD CKD-EPI 2021: 92.8 ML/MIN/1.73
EOSINOPHIL # BLD AUTO: 0.45 10*3/MM3 (ref 0–0.4)
EOSINOPHIL NFR BLD AUTO: 7 % (ref 0.3–6.2)
ERYTHROCYTE [DISTWIDTH] IN BLOOD BY AUTOMATED COUNT: 13 % (ref 12.3–15.4)
GLOBULIN UR ELPH-MCNC: 2.6 GM/DL
GLUCOSE SERPL-MCNC: 81 MG/DL (ref 65–99)
HCT VFR BLD AUTO: 48.2 % (ref 37.5–51)
HDLC SERPL-MCNC: 42 MG/DL (ref 40–60)
HGB BLD-MCNC: 15.6 G/DL (ref 13–17.7)
IMM GRANULOCYTES # BLD AUTO: 0.02 10*3/MM3 (ref 0–0.05)
IMM GRANULOCYTES NFR BLD AUTO: 0.3 % (ref 0–0.5)
LDLC SERPL CALC-MCNC: 88 MG/DL (ref 0–100)
LDLC/HDLC SERPL: 2.01 {RATIO}
LYMPHOCYTES # BLD AUTO: 2.36 10*3/MM3 (ref 0.7–3.1)
LYMPHOCYTES NFR BLD AUTO: 36.5 % (ref 19.6–45.3)
MCH RBC QN AUTO: 29.8 PG (ref 26.6–33)
MCHC RBC AUTO-ENTMCNC: 32.4 G/DL (ref 31.5–35.7)
MCV RBC AUTO: 92 FL (ref 79–97)
MONOCYTES # BLD AUTO: 0.73 10*3/MM3 (ref 0.1–0.9)
MONOCYTES NFR BLD AUTO: 11.3 % (ref 5–12)
NEUTROPHILS NFR BLD AUTO: 2.86 10*3/MM3 (ref 1.7–7)
NEUTROPHILS NFR BLD AUTO: 44.3 % (ref 42.7–76)
NRBC BLD AUTO-RTO: 0 /100 WBC (ref 0–0.2)
PLATELET # BLD AUTO: 245 10*3/MM3 (ref 140–450)
PMV BLD AUTO: 10.1 FL (ref 6–12)
POTASSIUM SERPL-SCNC: 4.4 MMOL/L (ref 3.5–5.2)
PROT SERPL-MCNC: 6.6 G/DL (ref 6–8.5)
PSA SERPL-MCNC: 0.85 NG/ML (ref 0–4)
RBC # BLD AUTO: 5.24 10*6/MM3 (ref 4.14–5.8)
SODIUM SERPL-SCNC: 144 MMOL/L (ref 136–145)
TRIGL SERPL-MCNC: 142 MG/DL (ref 0–150)
TSH SERPL DL<=0.05 MIU/L-ACNC: 2.27 UIU/ML (ref 0.27–4.2)
VLDLC SERPL-MCNC: 25 MG/DL (ref 5–40)
WBC NRBC COR # BLD AUTO: 6.46 10*3/MM3 (ref 3.4–10.8)

## 2025-03-14 PROCEDURE — 80061 LIPID PANEL: CPT | Performed by: FAMILY MEDICINE

## 2025-03-14 PROCEDURE — 99396 PREV VISIT EST AGE 40-64: CPT | Performed by: FAMILY MEDICINE

## 2025-03-14 PROCEDURE — 80050 GENERAL HEALTH PANEL: CPT | Performed by: FAMILY MEDICINE

## 2025-03-14 PROCEDURE — G0103 PSA SCREENING: HCPCS | Performed by: FAMILY MEDICINE

## 2025-03-14 NOTE — ASSESSMENT & PLAN NOTE
He is motion is good but he does have some weakness of his supraspinatus muscle bilaterally.  He does have some atrophy in the region of his supraspinatus as well.  Will get him back for Ortho to be reevaluated.

## 2025-03-14 NOTE — PROGRESS NOTES
Chief Complaint   Patient presents with    Annual Exam        Subjective     Jose Luis Casper  has a past medical history of Arthritis, Glaucoma, Limb swelling, Olecranon bursitis of left elbow (08/27/2015), Olecranon bursitis of left elbow (08/03/2015), Olecranon bursitis of left elbow (09/09/2015), Seasonal allergies, and Talus fracture (11/23/2017).    He presents today for an annual wellness exam. He reports he is doing well aside from some right shoulder pain on occasion; he denies referral to PT at this time.         PHQ-2 Depression Screening  Little interest or pleasure in doing things?     Feeling down, depressed, or hopeless?     PHQ-2 Total Score     PHQ-9 Depression Screening  Little interest or pleasure in doing things?     Feeling down, depressed, or hopeless?     Trouble falling or staying asleep, or sleeping too much?     Feeling tired or having little energy?     Poor appetite or overeating?     Feeling bad about yourself - or that you are a failure or have let yourself or your family down?     Trouble concentrating on things, such as reading the newspaper or watching television?     Moving or speaking so slowly that other people could have noticed? Or the opposite - being so fidgety or restless that you have been moving around a lot more than usual?     Thoughts that you would be better off dead, or of hurting yourself in some way?     PHQ-9 Total Score     If you checked off any problems, how difficult have these problems made it for you to do your work, take care of things at home, or get along with other people?       Allergies   Allergen Reactions    Acetaminophen Swelling    Bacitracin Rash       Prior to Admission medications    Medication Sig Start Date End Date Taking? Authorizing Provider   azithromycin (Zithromax Z-Clay) 250 MG tablet Take 2 PO today then take 1 daily on days 2-5 2/22/24  Yes Kitty Narayanan APRN   brimonidine (ALPHAGAN) 0.2 % ophthalmic solution Administer 1 drop  to both eyes 2 (Two) Times a Day. 10/18/22  Yes Provider, MD Matthew   guaiFENesin (MUCINEX) 600 MG 12 hr tablet Take 2 tablets by mouth 2 (Two) Times a Day.   Yes Provider, MD Matthew   tamsulosin (FLOMAX) 0.4 MG capsule 24 hr capsule TAKE ONE CAPSULE BY MOUTH ONCE NIGHTLY 2/20/25  Yes Steve Frederick DO        Patient Active Problem List   Diagnosis    Change in bowel habits    Benign prostatic hyperplasia with urinary frequency    History of colon polyps    Internal hemorrhoids    Annual physical exam    Screening for malignant neoplasm of prostate    Chronic right shoulder pain        Past Surgical History:   Procedure Laterality Date    COLONOSCOPY      COLONOSCOPY N/A 10/27/2022    Procedure: COLONOSCOPY WITH POLYPECTOMIES;  Surgeon: Val Soriano MD;  Location[facility] ENDOSCOPY;  Service: Gastroenterology;  Laterality: N/A;  COLON POLYPS, HEMORRHOIDS    ENDOSCOPY  2000    EYE SURGERY Bilateral     laser  cataract surgery    KNEE ACL RECONSTRUCTION      LUNG BIOPSY      OTHER SURGICAL HISTORY N/A     joint surgery left ACL repair    TOE SURGERY         Social History     Socioeconomic History    Marital status: Single   Tobacco Use    Smoking status: Never     Passive exposure: Never    Smokeless tobacco: Never   Vaping Use    Vaping status: Never Used   Substance and Sexual Activity    Alcohol use: Yes     Comment: occ    Drug use: Never       Family History   Problem Relation Age of Onset    Cancer Mother         unspecified    Heart disease Father     Diabetes Father         unspecified type    Arthritis Father     Arthritis Sister     Malig Hyperthermia Neg Hx        Family history, surgical history, past medical history, Allergies and meds reviewed with patient today and updated in [facility] EMR.     ROS:  Review of Systems   Constitutional:  Negative for fatigue.   HENT:  Negative for congestion, postnasal drip and rhinorrhea.    Eyes:  Negative for blurred vision and visual  "disturbance.   Respiratory:  Negative for cough, chest tightness, shortness of breath and wheezing.    Cardiovascular:  Negative for chest pain and palpitations.   Gastrointestinal:  Negative for abdominal pain, blood in stool, constipation, diarrhea and indigestion.   Musculoskeletal:  Positive for arthralgias.   Skin:  Negative for rash and skin lesions.   Allergic/Immunologic: Negative for environmental allergies.   Neurological:  Negative for light-headedness and headache.   Psychiatric/Behavioral:  Negative for sleep disturbance and depressed mood. The patient is not nervous/anxious.        OBJECTIVE:  Vitals:    03/14/25 1506   BP: 128/75   BP Location: Left arm   Patient Position: Sitting   Pulse: 68   Temp: 96.7 °F (35.9 °C)   SpO2: 97%   Weight: 91.2 kg (201 lb)   Height: 182.9 cm (72\")     No results found.   Body mass index is 27.26 kg/m².  No LMP for male patient.    The 10-year ASCVD risk score (Matti BLANCO, et al., 2019) is: 7%    Values used to calculate the score:      Age: 60 years      Sex: Male      Is Non- : No      Diabetic: No      Tobacco smoker: No      Systolic Blood Pressure: 128 mmHg      Is BP treated: No      HDL Cholesterol: 43 mg/dL      Total Cholesterol: 142 mg/dL     Physical Exam  Vitals and nursing note reviewed.   Constitutional:       General: He is not in acute distress.     Appearance: Normal appearance. He is normal weight.   HENT:      Head: Normocephalic.      Right Ear: Tympanic membrane, ear canal and external ear normal. There is impacted cerumen.      Left Ear: Tympanic membrane, ear canal and external ear normal.      Nose: Nose normal.      Mouth/Throat:      Mouth: Mucous membranes are moist.      Pharynx: Oropharynx is clear.   Eyes:      General: No scleral icterus.     Conjunctiva/sclera: Conjunctivae normal.      Pupils: Pupils are equal, round, and reactive to light.   Cardiovascular:      Rate and Rhythm: Normal rate and regular rhythm.    "   Pulses: Normal pulses.      Heart sounds: Normal heart sounds. No murmur heard.  Pulmonary:      Effort: Pulmonary effort is normal.      Breath sounds: Normal breath sounds. No wheezing, rhonchi or rales.   Abdominal:      General: Abdomen is flat.      Palpations: Abdomen is soft.   Musculoskeletal:      Right shoulder: Tenderness present.      Left shoulder: No tenderness or crepitus. Normal range of motion. Decreased strength (supraspinaous).      Cervical back: Neck supple. No rigidity or tenderness.      Comments: Atrophy of the supraspinatus muscle bilaterally right greater than left   Lymphadenopathy:      Cervical: No cervical adenopathy.   Skin:     General: Skin is warm and dry.      Coloration: Skin is not jaundiced.      Findings: No rash.   Neurological:      General: No focal deficit present.      Mental Status: He is alert and oriented to person, place, and time.   Psychiatric:         Mood and Affect: Mood normal.         Thought Content: Thought content normal.         Judgment: Judgment normal.         Procedures    No visits with results within 30 Day(s) from this visit.   Latest known visit with results is:   Office Visit on 02/02/2024   Component Date Value Ref Range Status    Glucose 02/02/2024 76  65 - 99 mg/dL Final    BUN 02/02/2024 21 (H)  6 - 20 mg/dL Final    Creatinine 02/02/2024 1.03  0.76 - 1.27 mg/dL Final    Sodium 02/02/2024 141  136 - 145 mmol/L Final    Potassium 02/02/2024 4.0  3.5 - 5.2 mmol/L Final    Chloride 02/02/2024 105  98 - 107 mmol/L Final    CO2 02/02/2024 26.0  22.0 - 29.0 mmol/L Final    Calcium 02/02/2024 9.6  8.6 - 10.5 mg/dL Final    Total Protein 02/02/2024 6.3  6.0 - 8.5 g/dL Final    Albumin 02/02/2024 4.1  3.5 - 5.2 g/dL Final    ALT (SGPT) 02/02/2024 14  1 - 41 U/L Final    AST (SGOT) 02/02/2024 18  1 - 40 U/L Final    Alkaline Phosphatase 02/02/2024 81  39 - 117 U/L Final    Total Bilirubin 02/02/2024 0.5  0.0 - 1.2 mg/dL Final    Globulin 02/02/2024 2.2   gm/dL Final    A/G Ratio 02/02/2024 1.9  g/dL Final    BUN/Creatinine Ratio 02/02/2024 20.4  7.0 - 25.0 Final    Anion Gap 02/02/2024 10.0  5.0 - 15.0 mmol/L Final    eGFR 02/02/2024 83.7  >60.0 mL/min/1.73 Final    TSH 02/02/2024 1.670  0.270 - 4.200 uIU/mL Final    Total Cholesterol 02/02/2024 142  0 - 200 mg/dL Final    Triglycerides 02/02/2024 55  0 - 150 mg/dL Final    HDL Cholesterol 02/02/2024 43  40 - 60 mg/dL Final    LDL Cholesterol  02/02/2024 87  0 - 100 mg/dL Final    VLDL Cholesterol 02/02/2024 12  5 - 40 mg/dL Final    LDL/HDL Ratio 02/02/2024 2.05   Final    PSA 02/02/2024 0.738  0.000 - 4.000 ng/mL Final    Hepatitis C Ab 02/02/2024 Non-Reactive  Non-Reactive Final    WBC 02/02/2024 5.05  3.40 - 10.80 10*3/mm3 Final    RBC 02/02/2024 4.93  4.14 - 5.80 10*6/mm3 Final    Hemoglobin 02/02/2024 15.0  13.0 - 17.7 g/dL Final    Hematocrit 02/02/2024 43.7  37.5 - 51.0 % Final    MCV 02/02/2024 88.6  79.0 - 97.0 fL Final    MCH 02/02/2024 30.4  26.6 - 33.0 pg Final    MCHC 02/02/2024 34.3  31.5 - 35.7 g/dL Final    RDW 02/02/2024 12.7  12.3 - 15.4 % Final    RDW-SD 02/02/2024 40.5  37.0 - 54.0 fl Final    MPV 02/02/2024 9.7  6.0 - 12.0 fL Final    Platelets 02/02/2024 228  140 - 450 10*3/mm3 Final    Neutrophil % 02/02/2024 54.2  42.7 - 76.0 % Final    Lymphocyte % 02/02/2024 27.9  19.6 - 45.3 % Final    Monocyte % 02/02/2024 12.3 (H)  5.0 - 12.0 % Final    Eosinophil % 02/02/2024 4.8  0.3 - 6.2 % Final    Basophil % 02/02/2024 0.6  0.0 - 1.5 % Final    Immature Grans % 02/02/2024 0.2  0.0 - 0.5 % Final    Neutrophils, Absolute 02/02/2024 2.74  1.70 - 7.00 10*3/mm3 Final    Lymphocytes, Absolute 02/02/2024 1.41  0.70 - 3.10 10*3/mm3 Final    Monocytes, Absolute 02/02/2024 0.62  0.10 - 0.90 10*3/mm3 Final    Eosinophils, Absolute 02/02/2024 0.24  0.00 - 0.40 10*3/mm3 Final    Basophils, Absolute 02/02/2024 0.03  0.00 - 0.20 10*3/mm3 Final    Immature Grans, Absolute 02/02/2024 0.01  0.00 - 0.05 10*3/mm3  Final    nRBC 02/02/2024 0.0  0.0 - 0.2 /100 WBC Final       ASSESSMENT/ PLAN:    Diagnoses and all orders for this visit:    1. Annual physical exam (Primary)  -     Comprehensive Metabolic Panel  -     CBC & Differential  -     TSH  -     Lipid Panel  -     PSA Screen    2. Chronic right shoulder pain  Assessment & Plan:  He is motion is good but he does have some weakness of his supraspinatus muscle bilaterally.  He does have some atrophy in the region of his supraspinatus as well.  Will get him back for Ortho to be reevaluated.    Orders:  -     Ambulatory Referral to Orthopedic Surgery    3. Screening for malignant neoplasm of prostate  -     PSA Screen        BMI is >= 25 and <30. (Overweight) The following options were offered after discussion;: exercise counseling/recommendations and nutrition counseling/recommendations      Orders Placed Today:     No orders of the defined types were placed in this encounter.       Management Plan:     An After Visit Summary was printed and given to the patient at discharge.    Follow-up: No follow-ups on file.    Steve Frederick DO 3/14/2025 15:31 EDT  This note was electronically signed.

## 2025-04-09 ENCOUNTER — OFFICE VISIT (OUTPATIENT)
Dept: ORTHOPEDIC SURGERY | Facility: CLINIC | Age: 60
End: 2025-04-09
Payer: COMMERCIAL

## 2025-04-09 VITALS
DIASTOLIC BLOOD PRESSURE: 67 MMHG | BODY MASS INDEX: 27.22 KG/M2 | HEART RATE: 84 BPM | HEIGHT: 72 IN | SYSTOLIC BLOOD PRESSURE: 106 MMHG | OXYGEN SATURATION: 97 % | WEIGHT: 201 LBS

## 2025-04-09 DIAGNOSIS — M25.511 RIGHT SHOULDER PAIN, UNSPECIFIED CHRONICITY: Primary | ICD-10-CM

## 2025-04-09 DIAGNOSIS — M75.41 IMPINGEMENT SYNDROME OF RIGHT SHOULDER: ICD-10-CM

## 2025-04-09 DIAGNOSIS — M75.51 BURSITIS OF RIGHT SHOULDER: ICD-10-CM

## 2025-04-09 RX ORDER — LIDOCAINE HYDROCHLORIDE 10 MG/ML
5 INJECTION, SOLUTION INFILTRATION; PERINEURAL
Status: COMPLETED | OUTPATIENT
Start: 2025-04-09 | End: 2025-04-09

## 2025-04-09 RX ORDER — TRIAMCINOLONE ACETONIDE 40 MG/ML
40 INJECTION, SUSPENSION INTRA-ARTICULAR; INTRAMUSCULAR
Status: COMPLETED | OUTPATIENT
Start: 2025-04-09 | End: 2025-04-09

## 2025-04-09 RX ADMIN — TRIAMCINOLONE ACETONIDE 40 MG: 40 INJECTION, SUSPENSION INTRA-ARTICULAR; INTRAMUSCULAR at 09:44

## 2025-04-09 RX ADMIN — LIDOCAINE HYDROCHLORIDE 5 ML: 10 INJECTION, SOLUTION INFILTRATION; PERINEURAL at 09:44

## 2025-04-09 NOTE — PROGRESS NOTES
"Chief Complaint  Initial Evaluation of the Right Shoulder     Subjective      Jose Luis Casper presents to [facility] for initial evaluation of the right shoulder.  He has pain in the scapula.  He slipped on the ice in October and fell on the right side and is having increase pain since then.  He notes the last few weeks it has decrease.  He has pain at end ROM and down the arm.  He has pain with certain movements and lifting.      Allergies   Allergen Reactions    Acetaminophen Swelling    Bacitracin Rash        Social History     Socioeconomic History    Marital status: Single   Tobacco Use    Smoking status: Never     Passive exposure: Never    Smokeless tobacco: Never   Vaping Use    Vaping status: Never Used   Substance and Sexual Activity    Alcohol use: Yes     Comment: occ    Drug use: Never        I reviewed the patient's chief complaint, history of present illness, review of systems, past medical history, surgical history, family history, social history, medications, and allergy list.     Review of Systems     Constitutional: Denies fevers, chills, weight loss  Cardiovascular: Denies chest pain, shortness of breath  Skin: Denies rashes, acute skin changes  Neurologic: Denies headache, loss of consciousness        Vital Signs:   /67   Pulse 84   Ht 182.9 cm (72\")   Wt 91.2 kg (201 lb)   SpO2 97%   BMI 27.26 kg/m²          Physical Exam  General: Alert. No acute distress    Ortho Exam        RIGHT SHOULDER Forward flexion 180. Abduction 140. External rotation 60. Internal rotation Thoracic spine. Positive Cross body adduction. Supraspinatus strength 5/5. Infraspinatus Strength 5/5. Infrared subscap 5/5. Positive Cruz. Positive Neer. Negative Apprehension. Negative Lift off. (Negative Obriens. Sensation intact to light touch, median, radial, ulnar nerve. Positive AIN, PIN, ulnar nerve motor. Positive pulses. Positive Impingement signs. Good strength in triceps, " biceps, deltoid, wrist extensors and wrist flexors. Tender to palpation to the anterior aspect of the shoulder and down the arm.  Pain with empty can testing.        Large Joint Arthrocentesis: R subacromial bursa  Date/Time: 4/9/2025 9:44 AM  Consent given by: patient  Site marked: site marked  Timeout: Immediately prior to procedure a time out was called to verify the correct patient, procedure, equipment, support staff and site/side marked as required   Supporting Documentation  Indications: pain   Procedure Details  Location: shoulder - R subacromial bursa  Preparation: Patient was prepped and draped in the usual sterile fashion  Needle gauge: 21 G.  Medications administered: 5 mL lidocaine 1 %; 40 mg triamcinolone acetonide 40 MG/ML  Patient tolerance: patient tolerated the procedure well with no immediate complications      This injection documentation was Scribed for Nir Boucher MD by Marylu Lorenz, RANJANA.  04/09/25   09:44 EDT      Imaging Results (Most Recent)       Procedure Component Value Units Date/Time    XR Scapula Right [[id number]] Resulted: 04/09/25 0849     Updated: 04/09/25 0850             Result Review :     X-Ray Report:  Right scapula X-Ray  Indication: Evaluation of the right scapula  AP/Lateral view(s)  Findings: Mild to moderate AC joint arthritis.    Prior studies available for comparison: no             Assessment and Plan     Diagnoses and all orders for this visit:    1. Right shoulder pain, unspecified chronicity (Primary)  -     XR Scapula Right    2. Impingement syndrome of right shoulder    3. Bursitis of right shoulder        Discussed the treatment plan with the patient. I reviewed the X-rays that were obtained today with the patient.     Discussed MRI of the right shoulder to assess the structure.     Discussed the risks and benefits of conservative measures. The patient expressed understanding and wished to proceed with a right shoulder steroid injection.  He tolerated the  injection well.     Discussed with the patient that due to the steroid injection given today in the office they may see an increase in blood sugar for a few days. Advised patient to monitor sugar after receiving the injection.     Discussed possibility of a reaction from the injection.  Discussed the possibility that the injection may not completely improve or remove the pain.  Discussed the risk of infection.    Take anti inflammatory as needed.     Call or return if worsening symptoms.    Follow Up     PRN      Patient was given instructions and counseling regarding his condition or for health maintenance advice. Please see specific information pulled into the AVS if appropriate.     Scribed for Nir Boucher MD by Shannan Barajas MA.  04/09/25   08:54 EDT      I have personally performed the services described in this document as scribed by the above individual and it is both accurate and complete. Nir Boucher MD 04/09/25

## 2025-06-17 ENCOUNTER — OFFICE VISIT (OUTPATIENT)
Dept: FAMILY MEDICINE CLINIC | Facility: CLINIC | Age: 60
End: 2025-06-17
Payer: COMMERCIAL

## 2025-06-17 VITALS
HEIGHT: 72 IN | HEART RATE: 65 BPM | DIASTOLIC BLOOD PRESSURE: 80 MMHG | TEMPERATURE: 97.1 F | WEIGHT: 197 LBS | BODY MASS INDEX: 26.68 KG/M2 | SYSTOLIC BLOOD PRESSURE: 114 MMHG | OXYGEN SATURATION: 96 %

## 2025-06-17 DIAGNOSIS — S30.860A TICK BITE OF LOWER BACK, INITIAL ENCOUNTER: Primary | ICD-10-CM
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
## 2025-06-17 PROCEDURE — 99213 OFFICE O/P EST LOW 20 MIN: CPT | Performed by: FAMILY MEDICINE

## 2025-06-17 RX ORDER — DOXYCYCLINE 100 MG/1
200 CAPSULE ORAL ONCE
Qty: 2 CAPSULE | Refills: 0 | Status: SHIPPED | OUTPATIENT
Start: 2025-06-17 | End: 2025-06-17

## 2025-06-17 NOTE — ASSESSMENT & PLAN NOTE
Neither tech was in place for more than 24 hours and neither tick was engorged.  Thus the likelihood of getting any tick related illnesses to these 2 tick bites is highly unlikely.  Will prophylax him with a course of doxycycline.

## 2025-06-17 NOTE — PROGRESS NOTES
Chief Complaint   Patient presents with    Tick Removal     Tick bite         Subjective     Jose Luis Casper  has a past medical history of Arthritis, Glaucoma, Limb swelling, Olecranon bursitis of left elbow (08/27/2015), Olecranon bursitis of left elbow (08/03/2015), Olecranon bursitis of left elbow (09/09/2015), Seasonal allergies, and Talus fracture (11/23/2017).    Tick bite-he states about 5 days ago he had to go out for a rescue and had to walk through some tall grass.  Later that evening and very early the next morning he found a tick on his backside as well as 1 on his left thigh and subsequently removed him at the time they were very tiny and not engorged.        PHQ-2 Depression Screening  Little interest or pleasure in doing things?     Feeling down, depressed, or hopeless?     PHQ-2 Total Score     PHQ-9 Depression Screening  Little interest or pleasure in doing things?     Feeling down, depressed, or hopeless?     Trouble falling or staying asleep, or sleeping too much?     Feeling tired or having little energy?     Poor appetite or overeating?     Feeling bad about yourself - or that you are a failure or have let yourself or your family down?     Trouble concentrating on things, such as reading the newspaper or watching television?     Moving or speaking so slowly that other people could have noticed? Or the opposite - being so fidgety or restless that you have been moving around a lot more than usual?     Thoughts that you would be better off dead, or of hurting yourself in some way?     PHQ-9 Total Score     If you checked off any problems, how difficult have these problems made it for you to do your work, take care of things at home, or get along with other people?       Allergies   Allergen Reactions    Acetaminophen Swelling    Bacitracin Rash       Prior to Admission medications    Medication Sig Start Date End Date Taking? Authorizing Provider   brimonidine (ALPHAGAN) 0.2 % ophthalmic solution  Administer 1 drop to both eyes 2 (Two) Times a Day. 10/18/22  Yes Provider, MD Matthew   tamsulosin (FLOMAX) 0.4 MG capsule 24 hr capsule TAKE ONE CAPSULE BY MOUTH ONCE NIGHTLY 2/20/25  Yes Steve Frederick DO        Patient Active Problem List   Diagnosis    Change in bowel habits    Benign prostatic hyperplasia with urinary frequency    History of colon polyps    Internal hemorrhoids    Annual physical exam    Screening for malignant neoplasm of prostate    Chronic right shoulder pain    Tick bite of lower back        Past Surgical History:   Procedure Laterality Date    COLONOSCOPY      COLONOSCOPY N/A 10/27/2022    Procedure: COLONOSCOPY WITH POLYPECTOMIES;  Surgeon: Val Soriano MD;  Location: [facility] ENDOSCOPY;  Service: Gastroenterology;  Laterality: N/A;  COLON POLYPS, HEMORRHOIDS    ENDOSCOPY  2000    EYE SURGERY Bilateral     laser  cataract surgery    KNEE ACL RECONSTRUCTION      LUNG BIOPSY      OTHER SURGICAL HISTORY N/A     joint surgery left ACL repair    TOE SURGERY         Social History     Socioeconomic History    Marital status: Single   Tobacco Use    Smoking status: Never     Passive exposure: Never    Smokeless tobacco: Never   Vaping Use    Vaping status: Never Used   Substance and Sexual Activity    Alcohol use: Yes     Comment: occ    Drug use: Never       Family History   Problem Relation Age of Onset    Cancer Mother         unspecified    Heart disease Father     Diabetes Father         unspecified type    Arthritis Father     Arthritis Sister     Malig Hyperthermia Neg Hx        Family history, surgical history, past medical history, Allergies and meds reviewed with patient today and updated in [facility] EMR.     ROS:  Review of Systems   Constitutional:  Negative for chills and fever.   Skin:  Negative for rash.       OBJECTIVE:  Vitals:    06/17/25 1211   BP: 114/80   BP Location: Left arm   Patient Position: Sitting   Pulse: 65   Temp: 97.1 °F (36.2 °C)   SpO2: 96%  "  Weight: 89.4 kg (197 lb)   Height: 182.9 cm (72\")     No results found.   Body mass index is 26.72 kg/m².  No LMP for male patient.    The 10-year ASCVD risk score (Matti BLANCO, et al., 2019) is: 6.4%    Values used to calculate the score:      Age: 60 years      Sex: Male      Is Non- : No      Diabetic: No      Tobacco smoker: No      Systolic Blood Pressure: 114 mmHg      Is BP treated: No      HDL Cholesterol: 42 mg/dL      Total Cholesterol: 155 mg/dL     Physical Exam  Vitals and nursing note reviewed.   Constitutional:       General: He is not in acute distress.     Appearance: Normal appearance. He is normal weight.   HENT:      Head: Normocephalic.   Skin:         Neurological:      Mental Status: He is alert.         Procedures    No visits with results within 30 Day(s) from this visit.   Latest known visit with results is:   Office Visit on 03/14/2025   Component Date Value Ref Range Status    Glucose 03/14/2025 81  65 - 99 mg/dL Final    BUN 03/14/2025 18  8 - 23 mg/dL Final    Creatinine 03/14/2025 0.94  0.76 - 1.27 mg/dL Final    Sodium 03/14/2025 144  136 - 145 mmol/L Final    Potassium 03/14/2025 4.4  3.5 - 5.2 mmol/L Final    Chloride 03/14/2025 106  98 - 107 mmol/L Final    CO2 03/14/2025 26.0  22.0 - 29.0 mmol/L Final    Calcium 03/14/2025 9.3  8.6 - 10.5 mg/dL Final    Total Protein 03/14/2025 6.6  6.0 - 8.5 g/dL Final    Albumin 03/14/2025 4.0  3.5 - 5.2 g/dL Final    ALT (SGPT) 03/14/2025 16  1 - 41 U/L Final    AST (SGOT) 03/14/2025 21  1 - 40 U/L Final    Alkaline Phosphatase 03/14/2025 92  39 - 117 U/L Final    Total Bilirubin 03/14/2025 0.5  0.0 - 1.2 mg/dL Final    Globulin 03/14/2025 2.6  gm/dL Final    A/G Ratio 03/14/2025 1.5  g/dL Final    BUN/Creatinine Ratio 03/14/2025 19.1  7.0 - 25.0 Final    Anion Gap 03/14/2025 12.0  5.0 - 15.0 mmol/L Final    eGFR 03/14/2025 92.8  >60.0 mL/min/1.73 Final    TSH 03/14/2025 2.270  0.270 - 4.200 uIU/mL Final    Total " Cholesterol 03/14/2025 155  0 - 200 mg/dL Final    Triglycerides 03/14/2025 142  0 - 150 mg/dL Final    HDL Cholesterol 03/14/2025 42  40 - 60 mg/dL Final    LDL Cholesterol  03/14/2025 88  0 - 100 mg/dL Final    VLDL Cholesterol 03/14/2025 25  5 - 40 mg/dL Final    LDL/HDL Ratio 03/14/2025 2.01   Final    PSA 03/14/2025 0.849  0.000 - 4.000 ng/mL Final    WBC 03/14/2025 6.46  3.40 - 10.80 10*3/mm3 Final    RBC 03/14/2025 5.24  4.14 - 5.80 10*6/mm3 Final    Hemoglobin 03/14/2025 15.6  13.0 - 17.7 g/dL Final    Hematocrit 03/14/2025 48.2  37.5 - 51.0 % Final    MCV 03/14/2025 92.0  79.0 - 97.0 fL Final    MCH 03/14/2025 29.8  26.6 - 33.0 pg Final    MCHC 03/14/2025 32.4  31.5 - 35.7 g/dL Final    RDW 03/14/2025 13.0  12.3 - 15.4 % Final    RDW-SD 03/14/2025 44.5  37.0 - 54.0 fl Final    MPV 03/14/2025 10.1  6.0 - 12.0 fL Final    Platelets 03/14/2025 245  140 - 450 10*3/mm3 Final    Neutrophil % 03/14/2025 44.3  42.7 - 76.0 % Final    Lymphocyte % 03/14/2025 36.5  19.6 - 45.3 % Final    Monocyte % 03/14/2025 11.3  5.0 - 12.0 % Final    Eosinophil % 03/14/2025 7.0 (H)  0.3 - 6.2 % Final    Basophil % 03/14/2025 0.6  0.0 - 1.5 % Final    Immature Grans % 03/14/2025 0.3  0.0 - 0.5 % Final    Neutrophils, Absolute 03/14/2025 2.86  1.70 - 7.00 10*3/mm3 Final    Lymphocytes, Absolute 03/14/2025 2.36  0.70 - 3.10 10*3/mm3 Final    Monocytes, Absolute 03/14/2025 0.73  0.10 - 0.90 10*3/mm3 Final    Eosinophils, Absolute 03/14/2025 0.45 (H)  0.00 - 0.40 10*3/mm3 Final    Basophils, Absolute 03/14/2025 0.04  0.00 - 0.20 10*3/mm3 Final    Immature Grans, Absolute 03/14/2025 0.02  0.00 - 0.05 10*3/mm3 Final    nRBC 03/14/2025 0.0  0.0 - 0.2 /100 WBC Final       ASSESSMENT/ PLAN:    Diagnoses and all orders for this visit:    1. Tick bite of lower back, initial encounter (Primary)  Assessment & Plan:  Neither tech was in place for more than 24 hours and neither tick was engorged.  Thus the likelihood of getting any tick related  illnesses to these 2 tick bites is highly unlikely.  Will prophylax him with a course of doxycycline.      Other orders  -     doxycycline (VIBRAMYCIN) 100 MG capsule; Take 2 capsules by mouth 1 (One) Time for 1 dose.  Dispense: 2 capsule; Refill: 0               Orders Placed Today:     New Medications Ordered This Visit   Medications    doxycycline (VIBRAMYCIN) 100 MG capsule     Sig: Take 2 capsules by mouth 1 (One) Time for 1 dose.     Dispense:  2 capsule     Refill:  0        Management Plan:     An After Visit Summary was printed and given to the patient at discharge.    Follow-up: Return if symptoms worsen or fail to improve.    Steve Frederick DO 6/17/2025 12:28 EDT  This note was electronically signed.

## (undated) DEVICE — SNAR E/S POLYP SNAREMASTER OVL/10MM 2.8X2300MM YEL

## (undated) DEVICE — CONN JET HYDRA H20 AUXILIARY DISP

## (undated) DEVICE — Device: Brand: DEFENDO AIR/WATER/SUCTION AND BIOPSY VALVE

## (undated) DEVICE — THE SINGLE USE ETRAP – POLYP TRAP IS USED FOR SUCTION RETRIEVAL OF ENDOSCOPICALLY REMOVED POLYPS.: Brand: ETRAP

## (undated) DEVICE — Device

## (undated) DEVICE — LINER SURG CANSTR SXN S/RIGD 1500CC

## (undated) DEVICE — SOLIDIFIER LIQLOC PLS 1500CC BT

## (undated) DEVICE — SOL IRRG H2O PL/BG 1000ML STRL

## (undated) DEVICE — SINGLE-USE BIOPSY FORCEPS: Brand: RADIAL JAW 4